# Patient Record
Sex: FEMALE | Race: WHITE | Employment: FULL TIME | URBAN - METROPOLITAN AREA
[De-identification: names, ages, dates, MRNs, and addresses within clinical notes are randomized per-mention and may not be internally consistent; named-entity substitution may affect disease eponyms.]

---

## 2017-07-27 ENCOUNTER — HOSPITAL ENCOUNTER (OUTPATIENT)
Dept: ULTRASOUND IMAGING | Age: 27
Discharge: HOME | End: 2017-07-27
Attending: PHYSICIAN ASSISTANT | Admitting: PHYSICIAN ASSISTANT

## 2017-07-27 DIAGNOSIS — R10.30 LOWER ABDOMINAL PAIN: ICD-10-CM

## 2017-12-19 ENCOUNTER — OFFICE VISIT (OUTPATIENT)
Dept: ORTHOPEDIC SURGERY | Age: 27
End: 2017-12-19

## 2017-12-19 VITALS — HEIGHT: 67 IN | WEIGHT: 293 LBS | BODY MASS INDEX: 45.99 KG/M2

## 2017-12-19 DIAGNOSIS — M79.642 PAIN IN BOTH HANDS: ICD-10-CM

## 2017-12-19 DIAGNOSIS — M19.041 ARTHRITIS OF BOTH HANDS: ICD-10-CM

## 2017-12-19 DIAGNOSIS — M19.042 ARTHRITIS OF BOTH HANDS: ICD-10-CM

## 2017-12-19 DIAGNOSIS — M79.641 PAIN IN BOTH HANDS: ICD-10-CM

## 2017-12-19 DIAGNOSIS — M25.521 RIGHT ELBOW PAIN: ICD-10-CM

## 2017-12-19 DIAGNOSIS — M77.11 RIGHT LATERAL EPICONDYLITIS: Primary | ICD-10-CM

## 2017-12-19 PROCEDURE — L3918 METACARP FX ORTHOSIS PRE OTS: HCPCS | Performed by: ORTHOPAEDIC SURGERY

## 2017-12-19 PROCEDURE — 73070 X-RAY EXAM OF ELBOW: CPT | Performed by: ORTHOPAEDIC SURGERY

## 2017-12-19 PROCEDURE — 99243 OFF/OP CNSLTJ NEW/EST LOW 30: CPT | Performed by: ORTHOPAEDIC SURGERY

## 2017-12-19 PROCEDURE — 73120 X-RAY EXAM OF HAND: CPT | Performed by: ORTHOPAEDIC SURGERY

## 2017-12-20 PROBLEM — M79.641 PAIN IN BOTH HANDS: Status: ACTIVE | Noted: 2017-12-20

## 2017-12-20 PROBLEM — M79.642 PAIN IN BOTH HANDS: Status: ACTIVE | Noted: 2017-12-20

## 2017-12-20 PROBLEM — M19.041 ARTHRITIS OF BOTH HANDS: Status: ACTIVE | Noted: 2017-12-20

## 2017-12-20 PROBLEM — M19.042 ARTHRITIS OF BOTH HANDS: Status: ACTIVE | Noted: 2017-12-20

## 2017-12-20 PROBLEM — M77.11 RIGHT LATERAL EPICONDYLITIS: Status: ACTIVE | Noted: 2017-12-20

## 2017-12-20 PROBLEM — M25.521 RIGHT ELBOW PAIN: Status: ACTIVE | Noted: 2017-12-20

## 2017-12-20 NOTE — PROGRESS NOTES
TENNIS ELBOW VISIT      HISTORY OF PRESENT ILLNESS    Diallo Martinez is a 28 y.o. female who presents for consultation at the request of Alejandra Wilburn CNP, for pain. She has an both hands and mostly her right elbow. She says she gets issues with pinching motions and also work with both hands and nothing seems to be helping her. She feels that it is giving worse over time. ROS    Well-documented in the patient history form dated 12/19/17  All other ROS negative except for above. Past Surgical history    Past Surgical History:   Procedure Laterality Date    APPENDECTOMY  2009    CHOLECYSTECTOMY  2009    TONSILLECTOMY  2007       PAST MEDICAL    History reviewed. No pertinent past medical history. Allergies    No Known Allergies    Meds    Current Outpatient Prescriptions   Medication Sig Dispense Refill    diclofenac 2 % SOLN Place 40 mg onto the skin 2 times daily PRN 1 Bottle 3    naproxen-esomeprazole (VIMOVO) 500-20 MG TBEC Take 1 tablet by mouth 2 times daily 60 tablet 3    UNKNOWN TO PATIENT Unknown name and dose of antibiotic for \"uti\"      albuterol (PROVENTIL HFA) 108 (90 BASE) MCG/ACT inhaler Inhale 1-2 puffs into the lungs every 6 hours as needed for Wheezing. 1 Inhaler 0     No current facility-administered medications for this visit. Social    Social History     Social History    Marital status:      Spouse name: N/A    Number of children: N/A    Years of education: N/A     Occupational History    Not on file.      Social History Main Topics    Smoking status: Never Smoker    Smokeless tobacco: Never Used    Alcohol use No    Drug use: No    Sexual activity: Yes     Partners: Male     Other Topics Concern    Not on file     Social History Narrative    No narrative on file       Family HISTORY    Family History   Problem Relation Age of Onset    Early Death Maternal Grandmother     Heart Disease Maternal Grandmother     Stroke Maternal Grandfather     Diabetes Paternal Grandfather        PHYSICAL EXAM    Vital Signs:  Ht 5' 7\" (1.702 m)   Wt 300 lb (136.1 kg)   BMI 46.99 kg/m²   General Appearance:  Normal body habitus. Alert and oriented to person, place, and time. Affect:  Normal.   Gait:  Normal. Good balance and coordination. Skin:  Intact. Sensation:  Intact in the median, ulnar and radial nerve distributions. Motor:  Normal in the median, ulnar and radial nerve distributions. Reflexes:  Intact. Pulses:  2+ radial pulses with brisk capillary refill to all fingers. Right Elbow Examination  Lateral Epicondyle is tender to palpation  Wrist extension test positive  Radial tunnel . Negative  Ligament stability: normal  Elbow flexion test is negative. Negative Tinels at the elbow. No ulnar nerve subluxation with flexion of elbow  No medial epicondyle tenderness to palpation  She has pain in both hands, mostly in the thumb areas at the base consistent with inflammation in the palm from repetitive use injury. She does not have an exam her testing at this point consistent with carpal tunnel syndrome in a pure sense. Range of motion  Right Left   Extension     Flexion     Supination     Pronation         IMAGING STUDIES    X-rays 2 views of both hands are normal.  X-rays 2 views of the right elbow are normal.        IMPRESSION    Right elbow pain secondary to lateral epicondylitis  Bilateral hand pain secondary to repetitive use injury    PLAN    1. Conservative care options including physical therapy, NSAIDs, counterforce bracing, and activity modification were discussed. 2.  The indications for therapeutic injections were discussed. 3.  The indications for additional imaging studies were discussed. 4.  After considering the various options discussed, the patient elected to pursue a course that includes bracing and medication and therapy. She may benefit from injections in the future.         Procedures    OTS CMC Restriction Brace Patient was prescribed a [facility] CMC Restriction Brace. The right thumb will require stabilization / immobilization from this semi-rigid / rigid orthosis to improve their function. The orthosis will assist in protecting the affected area, provide functional support and facilitate healing. The patient was educated and fit by a healthcare professional with expert knowledge and specialization in brace application while under the direct supervision of the physician. Verbal and written instructions for the use of and application of this item were provided. They were instructed to contact the office immediately should the brace result in increased pain, decreased sensation, increased swelling or worsening of the condition.

## 2019-05-14 ENCOUNTER — APPOINTMENT (OUTPATIENT)
Dept: GENERAL RADIOLOGY | Age: 29
End: 2019-05-14
Payer: COMMERCIAL

## 2019-05-14 ENCOUNTER — APPOINTMENT (OUTPATIENT)
Dept: CT IMAGING | Age: 29
End: 2019-05-14
Payer: COMMERCIAL

## 2019-05-14 ENCOUNTER — HOSPITAL ENCOUNTER (EMERGENCY)
Age: 29
Discharge: HOME | End: 2019-05-14
Attending: EMERGENCY MEDICINE
Payer: COMMERCIAL

## 2019-05-14 VITALS
HEIGHT: 66 IN | OXYGEN SATURATION: 99 % | RESPIRATION RATE: 16 BRPM | HEART RATE: 80 BPM | BODY MASS INDEX: 44.2 KG/M2 | TEMPERATURE: 98.6 F | DIASTOLIC BLOOD PRESSURE: 78 MMHG | WEIGHT: 275 LBS | SYSTOLIC BLOOD PRESSURE: 105 MMHG

## 2019-05-14 DIAGNOSIS — R07.1 CHEST PAIN ON BREATHING: Primary | ICD-10-CM

## 2019-05-14 LAB
A/G RATIO: 1.4 (ref 1.1–2.2)
ALBUMIN SERPL-MCNC: 4.5 G/DL (ref 3.4–5)
ALP BLD-CCNC: 82 U/L (ref 40–129)
ALT SERPL-CCNC: 15 U/L (ref 10–40)
ANION GAP SERPL CALCULATED.3IONS-SCNC: 9 MMOL/L (ref 3–16)
AST SERPL-CCNC: 17 U/L (ref 15–37)
BASOPHILS ABSOLUTE: 0.1 K/UL (ref 0–0.2)
BASOPHILS RELATIVE PERCENT: 0.4 %
BILIRUB SERPL-MCNC: 0.4 MG/DL (ref 0–1)
BUN BLDV-MCNC: 9 MG/DL (ref 7–20)
CALCIUM SERPL-MCNC: 9.9 MG/DL (ref 8.3–10.6)
CHLORIDE BLD-SCNC: 108 MMOL/L (ref 99–110)
CO2: 22 MMOL/L (ref 21–32)
CREAT SERPL-MCNC: 0.5 MG/DL (ref 0.6–1.1)
D DIMER: 210 NG/ML DDU (ref 0–229)
EKG ATRIAL RATE: 130 BPM
EKG DIAGNOSIS: NORMAL
EKG P AXIS: 88 DEGREES
EKG P-R INTERVAL: 146 MS
EKG Q-T INTERVAL: 310 MS
EKG QRS DURATION: 88 MS
EKG QTC CALCULATION (BAZETT): 456 MS
EKG R AXIS: 61 DEGREES
EKG T AXIS: 13 DEGREES
EKG VENTRICULAR RATE: 130 BPM
EOSINOPHILS ABSOLUTE: 0.1 K/UL (ref 0–0.6)
EOSINOPHILS RELATIVE PERCENT: 0.4 %
GFR AFRICAN AMERICAN: >60
GFR NON-AFRICAN AMERICAN: >60
GLOBULIN: 3.2 G/DL
GLUCOSE BLD-MCNC: 110 MG/DL (ref 70–99)
HCG(URINE) PREGNANCY TEST: NEGATIVE
HCT VFR BLD CALC: 41.4 % (ref 36–48)
HEMOGLOBIN: 13.7 G/DL (ref 12–16)
INR BLD: 1.12 (ref 0.86–1.14)
LYMPHOCYTES ABSOLUTE: 1.2 K/UL (ref 1–5.1)
LYMPHOCYTES RELATIVE PERCENT: 9.1 %
MCH RBC QN AUTO: 27.6 PG (ref 26–34)
MCHC RBC AUTO-ENTMCNC: 33.2 G/DL (ref 31–36)
MCV RBC AUTO: 83.2 FL (ref 80–100)
MONOCYTES ABSOLUTE: 0.8 K/UL (ref 0–1.3)
MONOCYTES RELATIVE PERCENT: 6.2 %
NEUTROPHILS ABSOLUTE: 10.9 K/UL (ref 1.7–7.7)
NEUTROPHILS RELATIVE PERCENT: 83.9 %
PDW BLD-RTO: 13.5 % (ref 12.4–15.4)
PLATELET # BLD: 280 K/UL (ref 135–450)
PMV BLD AUTO: 8.1 FL (ref 5–10.5)
POTASSIUM SERPL-SCNC: 4.9 MMOL/L (ref 3.5–5.1)
PROTHROMBIN TIME: 12.7 SEC (ref 9.8–13)
RBC # BLD: 4.98 M/UL (ref 4–5.2)
SODIUM BLD-SCNC: 139 MMOL/L (ref 136–145)
TOTAL PROTEIN: 7.7 G/DL (ref 6.4–8.2)
TROPONIN: <0.01 NG/ML
WBC # BLD: 13 K/UL (ref 4–11)

## 2019-05-14 PROCEDURE — 84484 ASSAY OF TROPONIN QUANT: CPT

## 2019-05-14 PROCEDURE — 71260 CT THORAX DX C+: CPT

## 2019-05-14 PROCEDURE — 96375 TX/PRO/DX INJ NEW DRUG ADDON: CPT

## 2019-05-14 PROCEDURE — 99285 EMERGENCY DEPT VISIT HI MDM: CPT

## 2019-05-14 PROCEDURE — 6360000004 HC RX CONTRAST MEDICATION: Performed by: EMERGENCY MEDICINE

## 2019-05-14 PROCEDURE — 80053 COMPREHEN METABOLIC PANEL: CPT

## 2019-05-14 PROCEDURE — 36415 COLL VENOUS BLD VENIPUNCTURE: CPT

## 2019-05-14 PROCEDURE — 6360000002 HC RX W HCPCS: Performed by: EMERGENCY MEDICINE

## 2019-05-14 PROCEDURE — 93010 ELECTROCARDIOGRAM REPORT: CPT | Performed by: INTERNAL MEDICINE

## 2019-05-14 PROCEDURE — 85379 FIBRIN DEGRADATION QUANT: CPT

## 2019-05-14 PROCEDURE — 71046 X-RAY EXAM CHEST 2 VIEWS: CPT

## 2019-05-14 PROCEDURE — 96374 THER/PROPH/DIAG INJ IV PUSH: CPT

## 2019-05-14 PROCEDURE — 84703 CHORIONIC GONADOTROPIN ASSAY: CPT

## 2019-05-14 PROCEDURE — 93005 ELECTROCARDIOGRAM TRACING: CPT | Performed by: EMERGENCY MEDICINE

## 2019-05-14 PROCEDURE — 85025 COMPLETE CBC W/AUTO DIFF WBC: CPT

## 2019-05-14 PROCEDURE — 6360000002 HC RX W HCPCS

## 2019-05-14 PROCEDURE — 85610 PROTHROMBIN TIME: CPT

## 2019-05-14 PROCEDURE — 2580000003 HC RX 258: Performed by: EMERGENCY MEDICINE

## 2019-05-14 RX ORDER — OXYCODONE HYDROCHLORIDE AND ACETAMINOPHEN 5; 325 MG/1; MG/1
1 TABLET ORAL EVERY 6 HOURS PRN
Qty: 12 TABLET | Refills: 0 | Status: SHIPPED | OUTPATIENT
Start: 2019-05-14 | End: 2019-05-17

## 2019-05-14 RX ORDER — MORPHINE SULFATE 10 MG/ML
INJECTION, SOLUTION INTRAMUSCULAR; INTRAVENOUS
Status: COMPLETED
Start: 2019-05-14 | End: 2019-05-14

## 2019-05-14 RX ORDER — SODIUM CHLORIDE 9 MG/ML
INJECTION, SOLUTION INTRAVENOUS CONTINUOUS
Status: DISCONTINUED | OUTPATIENT
Start: 2019-05-14 | End: 2019-05-14 | Stop reason: HOSPADM

## 2019-05-14 RX ORDER — KETOROLAC TROMETHAMINE 30 MG/ML
30 INJECTION, SOLUTION INTRAMUSCULAR; INTRAVENOUS ONCE
Status: COMPLETED | OUTPATIENT
Start: 2019-05-14 | End: 2019-05-14

## 2019-05-14 RX ORDER — DICLOFENAC SODIUM 75 MG/1
75 TABLET, DELAYED RELEASE ORAL 2 TIMES DAILY
Qty: 15 TABLET | Refills: 0 | Status: SHIPPED | OUTPATIENT
Start: 2019-05-14

## 2019-05-14 RX ORDER — ONDANSETRON 2 MG/ML
4 INJECTION INTRAMUSCULAR; INTRAVENOUS ONCE
Status: COMPLETED | OUTPATIENT
Start: 2019-05-14 | End: 2019-05-14

## 2019-05-14 RX ORDER — MORPHINE SULFATE 4 MG/ML
4 INJECTION, SOLUTION INTRAMUSCULAR; INTRAVENOUS ONCE
Status: DISCONTINUED | OUTPATIENT
Start: 2019-05-14 | End: 2019-05-14 | Stop reason: HOSPADM

## 2019-05-14 RX ADMIN — KETOROLAC TROMETHAMINE 30 MG: 30 INJECTION, SOLUTION INTRAMUSCULAR at 07:56

## 2019-05-14 RX ADMIN — IOPAMIDOL 75 ML: 755 INJECTION, SOLUTION INTRAVENOUS at 06:41

## 2019-05-14 RX ADMIN — MORPHINE SULFATE 4 MG: 10 INJECTION INTRAVENOUS at 06:10

## 2019-05-14 RX ADMIN — ONDANSETRON 4 MG: 2 INJECTION INTRAMUSCULAR; INTRAVENOUS at 06:10

## 2019-05-14 RX ADMIN — SODIUM CHLORIDE: 9 INJECTION, SOLUTION INTRAVENOUS at 06:10

## 2019-05-14 ASSESSMENT — ENCOUNTER SYMPTOMS
PHOTOPHOBIA: 0
ABDOMINAL DISTENTION: 0
ABDOMINAL PAIN: 0
COUGH: 0
CHEST TIGHTNESS: 0
CHOKING: 0
WHEEZING: 0
STRIDOR: 0
SHORTNESS OF BREATH: 1

## 2019-05-14 ASSESSMENT — PAIN SCALES - GENERAL
PAINLEVEL_OUTOF10: 7
PAINLEVEL_OUTOF10: 8
PAINLEVEL_OUTOF10: 7
PAINLEVEL_OUTOF10: 0

## 2019-05-14 ASSESSMENT — PAIN DESCRIPTION - ORIENTATION: ORIENTATION: ANTERIOR

## 2019-05-14 ASSESSMENT — PAIN DESCRIPTION - PAIN TYPE: TYPE: ACUTE PAIN

## 2019-05-14 ASSESSMENT — PAIN DESCRIPTION - LOCATION: LOCATION: CHEST

## 2019-05-14 NOTE — ED PROVIDER NOTES
shortness of breath. Negative for cough, choking, chest tightness, wheezing and stridor. Cardiovascular: Positive for chest pain. Negative for palpitations and leg swelling. Gastrointestinal: Negative for abdominal distention and abdominal pain. Genitourinary: Negative for decreased urine volume, difficulty urinating, pelvic pain, urgency, vaginal bleeding, vaginal discharge and vaginal pain. Neurological: Negative for dizziness, tremors, seizures, syncope, facial asymmetry, speech difficulty, weakness, light-headedness and headaches. All other systems reviewed and are negative. Patient Vitals for the past 24 hrs:   BP Temp Temp src Pulse Resp SpO2 Height Weight   05/14/19 0533 (!) 147/87 98.6 °F (37 °C) Oral 124 14 97 % 5' 6\" (1.676 m) 275 lb (124.7 kg)       Physical Exam   Constitutional: She is oriented to person, place, and time. She appears well-developed and well-nourished. No distress. HENT:   Head: Normocephalic. Right Ear: External ear normal.   Left Ear: External ear normal.   Mouth/Throat: Oropharynx is clear and moist.   Eyes: Pupils are equal, round, and reactive to light. Conjunctivae and EOM are normal. Right eye exhibits no discharge. Left eye exhibits no discharge. Neck: Normal range of motion. Neck supple. No thyromegaly present. Cardiovascular: Normal rate, regular rhythm, normal heart sounds and intact distal pulses. Exam reveals no gallop and no friction rub. No murmur heard. Pulmonary/Chest: Effort normal and breath sounds normal. No stridor. No respiratory distress. She has no wheezes. She has no rales. She exhibits no tenderness. Abdominal: Soft. Bowel sounds are normal. She exhibits no distension and no mass. There is no tenderness. There is no rebound and no guarding. No hernia. Neurological: She is alert and oriented to person, place, and time. She displays normal reflexes. No cranial nerve deficit or sensory deficit. She exhibits normal muscle tone. Coordination normal. GCS eye subscore is 4. GCS verbal subscore is 5. GCS motor subscore is 6. Skin: She is not diaphoretic. Psychiatric: She has a normal mood and affect. Her behavior is normal.   Nursing note and vitals reviewed. Procedures    MDM         Labs      Radiology      EKG Interpretation. EKG was interpreted by myself without the assistance of cardiology  Rate 1:30  Rhythm sinus tachycardia  No acute ST T wave changes  However patient does have a classical S1,q3 ,T3 pattern  No ectopy      The patient's workup has begun highly concerned about the possibility of a pulmonary embolism especially with her tachycardia and pleuritic left-sided chest pain she is not hypotensive  The patient will undergo CTA  The patient care will be turned over to my partner Dr. Jefferson Obregon at 7 AM.  A full report has been given.     Magdalena Allen MD  05/14/19 5398

## 2019-05-14 NOTE — LETTER
[facility] Emergency Department  [address] [address]  Phone: [phone]               May 14, 2019    Patient: Daisy Delatorre   YOB: 1990   Date of Visit: 5/14/2019       To Whom It May Concern:    Sundeep Griffin was seen and treated in our emergency department on 5/14/2019. She may return to work on 5/17/19.       Sincerely,       Jocelyn Whipple MD         Signature:__________________________________

## 2019-06-09 NOTE — ED NOTES
Instructed to return for worsening symptoms of cp sob, nausea palpitations. Diaphoresis. F/u with pcp this week. Push fluids. meds as directed. No diriving or working on percocet and no additional tylenol.  Pt v/u sig other present to drive     Estrella Hurley RN  05/14/19 0710
they're no murmur or rub is appreciated on the exam.  And patient's heart rate is now in a normal sinus rhythm. Tachycardia has resolved.     Xr Chest Standard (2 Vw)     Result Date: 5/14/2019  Mild left basilar airspace disease which is favored to represent atelectasis over pneumonia.      Ct Chest Pulmonary Embolism W Contrast     Result Date: 5/14/2019  No evidence of pulmonary embolism or acute pulmonary abnormality.         Discussed with the patient the results of the imaging. Dr. Polina Polo have discussed previous results with her. At this time she'll be given a dose of Toradol and I will consult her primary care which is intra-[place]. We discussed being off work as well as pain medication.     I placed a call to her primary caregiver Marlene Echols. He is currently in a meeting. Patient would like to go ahead and be discharged home she is asked to follow-up with him before returning to work. She is given a note for work. The next 3 days. She understands limitations of the workup that should something be changing or different she is asked to return. We discussed this at length she and her family are agreeable to this and she voiced understanding of all instructions and is discharged in good condition. Discussed results, diagnosis and plan with patient and/or family. Questions addressed. Disposition and follow-up agreed upon. Specific discharge instructions explained. The patient and/or family and I have discussed the diagnosis and risks, and we agree with discharging home to follow-up with their primary care, specialist or referral doctor. We also discussed returning to the Emergency Department immediately if new or worsening symptoms occur.  We have discussed the symptoms which are most concerning that necessitate immediate return.        The Clinical Impression is chest pain                 Rody Olsen MD  05/14/19 1710                   Revision History

## 2019-07-09 NOTE — ED PROVIDER NOTES
and rhythm normal S1-S2 without gallop or murmur abdomen is obese soft nontender no guarding rebound masses 77 no cyanosis erythema edema cords or [place] is placed in supine position worsens or discomfort when she is brought forward it makes it at her.  Again they're no murmur or rub is appreciated on the exam.  And patient's heart rate is now in a normal sinus rhythm.  Tachycardia has resolved.     Xr Chest Standard (2 Vw)     Result Date: 5/14/2019  Mild left basilar airspace disease which is favored to represent atelectasis over pneumonia.      Ct Chest Pulmonary Embolism W Contrast     Result Date: 5/14/2019  No evidence of pulmonary embolism or acute pulmonary abnormality.         Discussed with the patient the results of the imaging.  Dr. Huyen Graham have discussed previous results with her. Chanda Sprung this time she'll be given a dose of Toradol and I will consult her primary care which is intra-[facility].  We discussed being off work as well as pain medication.     I placed a call to her primary caregiver Rima Guevara is currently in a meeting. Jakie Castleman would like to go ahead and be discharged home she is asked to follow-up with him before returning to work. Abdullahi Nation is given a note for work. Ron Otonielof next 3 days. Abdullahi Nation understands limitations of the workup that should something be changing or different she is asked to return.  We discussed this at length she and her family are agreeable to this and she voiced understanding of all instructions and is discharged in good condition.    Discussed results, diagnosis and plan with patient and/or family.  Questions addressed.  Disposition and follow-up agreed upon.  Specific discharge instructions explained.   The patient and/or family and I have discussed the diagnosis and risks, and we agree with discharging home to follow-up with their primary care, specialist or referral doctor.  We also discussed returning to the Emergency Department immediately if new or worsening symptoms occur.  We have discussed the symptoms which are most concerning that necessitate immediate return.        The Clinical Impression is chest pain                 Cathy Deshpande MD  05/14/19 0628                     Revision History                         Cathy Deshpande MD  07/09/19 9315

## 2021-12-14 ENCOUNTER — HOSPITAL ENCOUNTER (OUTPATIENT)
Dept: ULTRASOUND IMAGING | Age: 31
Discharge: HOME | End: 2021-12-14
Payer: COMMERCIAL

## 2021-12-14 DIAGNOSIS — R59.1 LYMPHADENOPATHY: ICD-10-CM

## 2021-12-14 PROCEDURE — 76536 US EXAM OF HEAD AND NECK: CPT

## 2024-03-27 NOTE — PROGRESS NOTES
[facility]  DIVISION OF OTOLARYNGOLOGY- HEAD & NECK SURGERY  CONSULT    Patient Name: Ilene Forrest  Medical Record Number:  [id number]  Primary Care Physician:  Everardo Sweeney, APRN - CNP  Date of Consultation: 4/2/2024    Chief Complaint:   Chief Complaint   Patient presents with    New Patient     Started @ Dec treated with antibiotic, mucinex, and sinus rinses.  Given Steroid in Feb.  No relief.    Sinus Problem     Hoarseness for about 6 wk.        HISTORY OF PRESENT ILLNESS  Ilene is a(n) 33 y.o. female who presents for evaluation of sinus issues, sore throat, hoarseness, and eustachian tube dysfunction.  She has been given antibiotics Mucinex sinus rinses and steroids without relief.  She is also taking Flonase Astelin and Zyrtec.  She does not smoke nor has she ever.  Patient Active Problem List   Diagnosis    Pain in both hands    Right elbow pain    Arthritis of both hands    Right lateral epicondylitis     Past Surgical History:   Procedure Laterality Date    APPENDECTOMY  2009    CHOLECYSTECTOMY  2009    TONSILLECTOMY  2007     Family History   Problem Relation Age of Onset    Early Death Maternal Grandmother     Heart Disease Maternal Grandmother     Stroke Maternal Grandfather     Diabetes Paternal Grandfather      Social History     Socioeconomic History    Marital status:      Spouse name: Not on file    Number of children: Not on file    Years of education: Not on file    Highest education level: Not on file   Occupational History    Not on file   Tobacco Use    Smoking status: Never    Smokeless tobacco: Never   Substance and Sexual Activity    Alcohol use: No    Drug use: No    Sexual activity: Yes     Partners: Male   Other Topics Concern    Not on file   Social History Narrative    Not on file     Social Determinants of Health     Financial Resource Strain: Not on file   Food Insecurity: Not on file   Transportation Needs: Not on file   Physical Activity: Not on file   Stress: Not

## 2024-04-02 ENCOUNTER — OFFICE VISIT (OUTPATIENT)
Dept: ENT CLINIC | Age: 34
End: 2024-04-02
Payer: COMMERCIAL

## 2024-04-02 VITALS
HEIGHT: 66 IN | WEIGHT: 275 LBS | HEART RATE: 62 BPM | BODY MASS INDEX: 44.2 KG/M2 | DIASTOLIC BLOOD PRESSURE: 76 MMHG | SYSTOLIC BLOOD PRESSURE: 138 MMHG

## 2024-04-02 DIAGNOSIS — J30.2 SEASONAL ALLERGIES: ICD-10-CM

## 2024-04-02 DIAGNOSIS — K21.9 LARYNGOPHARYNGEAL REFLUX (LPR): Primary | ICD-10-CM

## 2024-04-02 PROCEDURE — 99204 OFFICE O/P NEW MOD 45 MIN: CPT | Performed by: STUDENT IN AN ORGANIZED HEALTH CARE EDUCATION/TRAINING PROGRAM

## 2024-04-02 PROCEDURE — 31575 DIAGNOSTIC LARYNGOSCOPY: CPT | Performed by: STUDENT IN AN ORGANIZED HEALTH CARE EDUCATION/TRAINING PROGRAM

## 2024-04-02 RX ORDER — AZELASTINE 1 MG/ML
1 SPRAY, METERED NASAL 2 TIMES DAILY
COMMUNITY

## 2024-04-02 RX ORDER — CETIRIZINE HYDROCHLORIDE 10 MG/1
10 TABLET ORAL DAILY
COMMUNITY

## 2024-04-02 RX ORDER — PANTOPRAZOLE SODIUM 20 MG/1
20 TABLET, DELAYED RELEASE ORAL
Qty: 90 TABLET | Refills: 1 | Status: SHIPPED | OUTPATIENT
Start: 2024-04-02

## 2024-04-02 RX ORDER — FLUTICASONE PROPIONATE 50 MCG
1 SPRAY, SUSPENSION (ML) NASAL DAILY
COMMUNITY

## 2024-04-02 ASSESSMENT — ENCOUNTER SYMPTOMS
COUGH: 0
SHORTNESS OF BREATH: 0
NAUSEA: 0
VOMITING: 0
EYE PAIN: 0
RHINORRHEA: 1

## 2024-06-03 ENCOUNTER — OFFICE VISIT (OUTPATIENT)
Dept: ENT CLINIC | Age: 34
End: 2024-06-03
Payer: COMMERCIAL

## 2024-06-03 VITALS
HEIGHT: 66 IN | BODY MASS INDEX: 44.2 KG/M2 | WEIGHT: 275 LBS | HEART RATE: 76 BPM | SYSTOLIC BLOOD PRESSURE: 152 MMHG | DIASTOLIC BLOOD PRESSURE: 94 MMHG

## 2024-06-03 DIAGNOSIS — J01.41 RECURRENT PANSINUSITIS: Primary | ICD-10-CM

## 2024-06-03 DIAGNOSIS — J30.2 SEASONAL ALLERGIES: ICD-10-CM

## 2024-06-03 DIAGNOSIS — K21.9 LARYNGOPHARYNGEAL REFLUX (LPR): ICD-10-CM

## 2024-06-03 PROCEDURE — 99213 OFFICE O/P EST LOW 20 MIN: CPT | Performed by: STUDENT IN AN ORGANIZED HEALTH CARE EDUCATION/TRAINING PROGRAM

## 2024-06-13 ENCOUNTER — HOSPITAL ENCOUNTER (OUTPATIENT)
Dept: CT IMAGING | Age: 34
Discharge: HOME | End: 2024-06-13
Attending: STUDENT IN AN ORGANIZED HEALTH CARE EDUCATION/TRAINING PROGRAM
Payer: COMMERCIAL

## 2024-06-13 DIAGNOSIS — J01.41 RECURRENT PANSINUSITIS: ICD-10-CM

## 2024-06-13 PROCEDURE — 70486 CT MAXILLOFACIAL W/O DYE: CPT

## 2024-06-25 ASSESSMENT — ENCOUNTER SYMPTOMS
VOICE CHANGE: 1
COUGH: 0
EYE PAIN: 0
RHINORRHEA: 1
VOMITING: 0
SHORTNESS OF BREATH: 0
NAUSEA: 0

## 2024-06-25 NOTE — H&P (VIEW-ONLY)
[facility]  DIVISION OF OTOLARYNGOLOGY- HEAD & NECK SURGERY  CONSULT    Patient Name: Ilene Forrest  Medical Record Number:  [id number]  Primary Care Physician:  Everardo Sweeney, APRN - CNP  Date of Consultation: 6/27/2024    Chief Complaint:   Chief Complaint   Patient presents with    Follow-up     Patient is being seen for a f/u ct results, neck pain and swelling.       HISTORY OF PRESENT ILLNESS  Ilene is a(n) 33 y.o. female who presents for evaluation of sinus issues, sore throat, hoarseness, and eustachian tube dysfunction.  She has been given antibiotics Mucinex sinus rinses and steroids without relief.  She is also taking Flonase Astelin and Zyrtec.  She does not smoke nor has she ever.    Interval history 6/3/2024  Ilene states that she is just finishing cefdinir and steroids for a double ear infection and nasal congestion.  She continues to have facial pain and pressure despite treatment.  She is currently taking Flonase Astelin and Zyrtec.    Interval History 6/27/2024  CT performed on 6/18/2024 and presents for results.  She continues to have facial pain and pressure with nasal discharge.  She is also having some right-sided neck pain.    Patient Active Problem List   Diagnosis    Pain in both hands    Right elbow pain    Arthritis of both hands    Right lateral epicondylitis     Past Surgical History:   Procedure Laterality Date    APPENDECTOMY  2009    CHOLECYSTECTOMY  2009    TONSILLECTOMY  2007     Family History   Problem Relation Age of Onset    Early Death Maternal Grandmother     Heart Disease Maternal Grandmother     Stroke Maternal Grandfather     Diabetes Paternal Grandfather      Social History     Socioeconomic History    Marital status:      Spouse name: Not on file    Number of children: Not on file    Years of education: Not on file    Highest education level: Not on file   Occupational History    Not on file   Tobacco Use    Smoking status: Never    Smokeless tobacco:  discussed with the patient.  Risks include but not limited to damage to the eye, the brain, fluid surrounding the brain, infection, bleeding, the risk of general anesthesia.  She is willing to accept these risks and move forward with surgery at her convenience.  Postoperative instructions were given for education purposes    2. Seasonal allergies  We will also have her perform allergy testing as she is on Flonase, Astelin, and Zyrtec  - FL PERCUTANEOUS TESTS W/ALLERGENIC EXTRACTS  - FL IC TSTS W/ALLGIC XTRCS DLYD TYP RXN W/READING    3. Deviated nasal septum  We will address the deviated nasal septum at the time of surgery    Follow-up 1 week after surgery.  Abiodun Marrufo DO  06/27/24    Medical Decision Making:  The following items were considered in medical decision making:  Independent review of images  Review / order clinical lab tests  Review / order radiology tests  Decision to obtain old records

## 2024-06-25 NOTE — PROGRESS NOTES
[facility]  DIVISION OF OTOLARYNGOLOGY- HEAD & NECK SURGERY  CONSULT    Patient Name: Ilene Forrest  Medical Record Number:  [id number]  Primary Care Physician:  Everardo Sweeney, APRN - CNP  Date of Consultation: 6/27/2024    Chief Complaint:   Chief Complaint   Patient presents with    Follow-up     Patient is being seen for a f/u ct results, neck pain and swelling.       HISTORY OF PRESENT ILLNESS  Ilene is a(n) 33 y.o. female who presents for evaluation of sinus issues, sore throat, hoarseness, and eustachian tube dysfunction.  She has been given antibiotics Mucinex sinus rinses and steroids without relief.  She is also taking Flonase Astelin and Zyrtec.  She does not smoke nor has she ever.    Interval history 6/3/2024  Ilene states that she is just finishing cefdinir and steroids for a double ear infection and nasal congestion.  She continues to have facial pain and pressure despite treatment.  She is currently taking Flonase Astelin and Zyrtec.    Interval History 6/27/2024  CT performed on 6/18/2024 and presents for results.  She continues to have facial pain and pressure with nasal discharge.  She is also having some right-sided neck pain.    Patient Active Problem List   Diagnosis    Pain in both hands    Right elbow pain    Arthritis of both hands    Right lateral epicondylitis     Past Surgical History:   Procedure Laterality Date    APPENDECTOMY  2009    CHOLECYSTECTOMY  2009    TONSILLECTOMY  2007     Family History   Problem Relation Age of Onset    Early Death Maternal Grandmother     Heart Disease Maternal Grandmother     Stroke Maternal Grandfather     Diabetes Paternal Grandfather      Social History     Socioeconomic History    Marital status:      Spouse name: Not on file    Number of children: Not on file    Years of education: Not on file    Highest education level: Not on file   Occupational History    Not on file   Tobacco Use    Smoking status: Never    Smokeless tobacco:

## 2024-06-27 ENCOUNTER — PREP FOR PROCEDURE (OUTPATIENT)
Dept: ENT CLINIC | Age: 34
End: 2024-06-27

## 2024-06-27 ENCOUNTER — OFFICE VISIT (OUTPATIENT)
Dept: ENT CLINIC | Age: 34
End: 2024-06-27
Payer: COMMERCIAL

## 2024-06-27 VITALS
WEIGHT: 275 LBS | RESPIRATION RATE: 16 BRPM | DIASTOLIC BLOOD PRESSURE: 88 MMHG | SYSTOLIC BLOOD PRESSURE: 140 MMHG | HEIGHT: 66 IN | BODY MASS INDEX: 44.2 KG/M2 | HEART RATE: 80 BPM

## 2024-06-27 DIAGNOSIS — J34.3 HYPERTROPHY OF NASAL TURBINATES: ICD-10-CM

## 2024-06-27 DIAGNOSIS — J34.2 DEVIATED NASAL SEPTUM: ICD-10-CM

## 2024-06-27 DIAGNOSIS — J32.4 CHRONIC PANSINUSITIS: Primary | ICD-10-CM

## 2024-06-27 DIAGNOSIS — J30.2 SEASONAL ALLERGIES: ICD-10-CM

## 2024-06-27 DIAGNOSIS — J32.4 CHRONIC PANSINUSITIS: ICD-10-CM

## 2024-06-27 PROCEDURE — 95028 IQ TSTS ALLERGY DELAYED RXN: CPT | Performed by: STUDENT IN AN ORGANIZED HEALTH CARE EDUCATION/TRAINING PROGRAM

## 2024-06-27 PROCEDURE — 99214 OFFICE O/P EST MOD 30 MIN: CPT | Performed by: STUDENT IN AN ORGANIZED HEALTH CARE EDUCATION/TRAINING PROGRAM

## 2024-06-27 PROCEDURE — 95004 PERQ TESTS W/ALRGNC XTRCS: CPT | Performed by: STUDENT IN AN ORGANIZED HEALTH CARE EDUCATION/TRAINING PROGRAM

## 2024-06-27 NOTE — PATIENT INSTRUCTIONS
SINUS SURGERY (FESS)  Post Operative Instructions    [facility] Number (24 hours): [phone]    The Surgery Itself  Endoscopic sinus surgery (with or without septoplasty and turbinate reduction) involves general anesthesia, typically for one to two hours. Patients may be sedated for several hours after surgery and may remain sleepy for the better part of the day. Nausea and vomiting are occasionally seen, and usually resolve by the evening of surgery - even without additional medications. Almost all patients can go home the day of surgery.    After Surgery  Facial pressure and fullness similar to a sinus infection/headache is normal after surgery. Breathing through your nose is also difficult due to swelling. A humidifier or vaporizer can be used in the bedroom to prevent throat pain with mouth breathing.  Bloody nasal drainage is normal after this surgery for 5-7 days, usually decreasing in volume with each day that passes. Drainage will flow from the front of the nose and down the back of the throat. Make sure you spit out blood drainage that drips down the back of your throat to prevent nausea/vomiting. You will have a nasal drip pad/sling with gauze to catch drainage from the front of your nose. The dressing may need to be changed frequently during the first 24 hours following surgery. In case of profuse nasal bleeding, you may apply ice to the bridge of the nose and pinch the nose just above the tip and hold for 10 minutes; if bleeding continues, contact the doctors office.  Started the day after sinus surgery use sinus irrigations at least 3 times per day (full bottle).  The most common way is to use Neomed sinus rinses.  Fill the bottle with distilled water and mixed the salt bicarbonate packet.  You can be seen at this website: [email or web address]   It is more comfortable to sleep with extra pillows or in a recliner for the first few days after surgery until the

## 2024-07-01 ENCOUNTER — TELEPHONE (OUTPATIENT)
Dept: ENT CLINIC | Age: 34
End: 2024-07-01

## 2024-07-02 ENCOUNTER — PATIENT MESSAGE (OUTPATIENT)
Dept: ENT CLINIC | Age: 34
End: 2024-07-02

## 2024-07-02 RX ORDER — METHYLPREDNISOLONE 4 MG/1
TABLET ORAL
Qty: 1 KIT | Refills: 0 | Status: SHIPPED | OUTPATIENT
Start: 2024-07-02 | End: 2024-07-08

## 2024-07-02 NOTE — TELEPHONE ENCOUNTER
From: Ilene Forrest  To: Dr. Abiodun Marrufo  Sent: 7/2/2024 11:14 AM EDT  Subject: Ear pain    Good morning Abiodun,    I was curious if it would be ok to have another round of steroids and antibiotics to provide some relief for my vacation next week. The ear fulness and pain has returned and is getting worse each day. My PCP said the ears appear full and she was ok with another round but wanted it cleared by you with the upcoming surgery.    Thanks,   Ilene

## 2024-07-05 ENCOUNTER — TELEPHONE (OUTPATIENT)
Dept: ENT CLINIC | Age: 34
End: 2024-07-05

## 2024-07-05 ENCOUNTER — PATIENT MESSAGE (OUTPATIENT)
Dept: ENT CLINIC | Age: 34
End: 2024-07-05

## 2024-07-05 NOTE — TELEPHONE ENCOUNTER
From: Ilene Forrest  To: Dr. Abiodun Marrufo  Sent: 7/5/2024 7:16 AM EDT  Subject: Allergy Testing    Good morning,   I have yet to hear from the allergy testing department, is there a number I can call to schedule it myself?     Thanks,  Ilene

## 2024-07-10 ENCOUNTER — TELEPHONE (OUTPATIENT)
Dept: ENT CLINIC | Age: 34
End: 2024-07-10

## 2024-07-10 NOTE — TELEPHONE ENCOUNTER
Asked by Dr. Marrufo to discuss allergy testing with this patient.  Allergy Welcome Packet given to patient in the office and contents reviewed over the phone.  Patient will call to check insurance coverage.     Pt verb understanding to stop taking all antihistamines 7 days prior to testing. Also to stop Astelin nasal spray 2 days before testing.     Allergy testing scheduled for 8/8/24.

## 2024-07-15 RX ORDER — SODIUM CHLORIDE 9 MG/ML
INJECTION, SOLUTION INTRAVENOUS PRN
Status: CANCELLED | OUTPATIENT
Start: 2024-07-15

## 2024-07-15 RX ORDER — SODIUM CHLORIDE 0.9 % (FLUSH) 0.9 %
5-40 SYRINGE (ML) INJECTION EVERY 12 HOURS SCHEDULED
Status: CANCELLED | OUTPATIENT
Start: 2024-07-15

## 2024-07-15 RX ORDER — SODIUM CHLORIDE 0.9 % (FLUSH) 0.9 %
5-40 SYRINGE (ML) INJECTION PRN
Status: CANCELLED | OUTPATIENT
Start: 2024-07-15

## 2024-07-16 ENCOUNTER — ANESTHESIA EVENT (OUTPATIENT)
Dept: OPERATING ROOM | Age: 34
End: 2024-07-16
Payer: COMMERCIAL

## 2024-07-16 ENCOUNTER — TELEPHONE (OUTPATIENT)
Dept: ENT CLINIC | Age: 34
End: 2024-07-16

## 2024-07-16 NOTE — TELEPHONE ENCOUNTER
Called [facility] and spoke to Delia, she gave a Auth # of [id number]. Auth is good for surgery date of 7/17/24

## 2024-07-17 ENCOUNTER — ANESTHESIA (OUTPATIENT)
Dept: OPERATING ROOM | Age: 34
End: 2024-07-17
Payer: COMMERCIAL

## 2024-07-17 ENCOUNTER — HOSPITAL ENCOUNTER (OUTPATIENT)
Age: 34
Setting detail: OUTPATIENT SURGERY
Discharge: HOME | End: 2024-07-17
Attending: STUDENT IN AN ORGANIZED HEALTH CARE EDUCATION/TRAINING PROGRAM | Admitting: STUDENT IN AN ORGANIZED HEALTH CARE EDUCATION/TRAINING PROGRAM
Payer: COMMERCIAL

## 2024-07-17 VITALS
HEART RATE: 82 BPM | OXYGEN SATURATION: 94 % | RESPIRATION RATE: 11 BRPM | TEMPERATURE: 97.6 F | SYSTOLIC BLOOD PRESSURE: 154 MMHG | DIASTOLIC BLOOD PRESSURE: 111 MMHG

## 2024-07-17 DIAGNOSIS — J34.2 DEVIATED NASAL SEPTUM: ICD-10-CM

## 2024-07-17 DIAGNOSIS — J32.4 CHRONIC PANSINUSITIS: ICD-10-CM

## 2024-07-17 DIAGNOSIS — Z98.890 STATUS POST SURGERY: Primary | ICD-10-CM

## 2024-07-17 DIAGNOSIS — J34.3 HYPERTROPHY OF NASAL TURBINATES: ICD-10-CM

## 2024-07-17 LAB — HCG UR QL: NEGATIVE

## 2024-07-17 PROCEDURE — 7100000001 HC PACU RECOVERY - ADDTL 15 MIN: Performed by: STUDENT IN AN ORGANIZED HEALTH CARE EDUCATION/TRAINING PROGRAM

## 2024-07-17 PROCEDURE — 6360000002 HC RX W HCPCS: Performed by: NURSE ANESTHETIST, CERTIFIED REGISTERED

## 2024-07-17 PROCEDURE — 6370000000 HC RX 637 (ALT 250 FOR IP): Performed by: STUDENT IN AN ORGANIZED HEALTH CARE EDUCATION/TRAINING PROGRAM

## 2024-07-17 PROCEDURE — 30140 RESECT INFERIOR TURBINATE: CPT | Performed by: STUDENT IN AN ORGANIZED HEALTH CARE EDUCATION/TRAINING PROGRAM

## 2024-07-17 PROCEDURE — 2720000010 HC SURG SUPPLY STERILE: Performed by: STUDENT IN AN ORGANIZED HEALTH CARE EDUCATION/TRAINING PROGRAM

## 2024-07-17 PROCEDURE — 3600000014 HC SURGERY LEVEL 4 ADDTL 15MIN: Performed by: STUDENT IN AN ORGANIZED HEALTH CARE EDUCATION/TRAINING PROGRAM

## 2024-07-17 PROCEDURE — 61782 SCAN PROC CRANIAL EXTRA: CPT | Performed by: STUDENT IN AN ORGANIZED HEALTH CARE EDUCATION/TRAINING PROGRAM

## 2024-07-17 PROCEDURE — 2500000003 HC RX 250 WO HCPCS: Performed by: NURSE ANESTHETIST, CERTIFIED REGISTERED

## 2024-07-17 PROCEDURE — 84703 CHORIONIC GONADOTROPIN ASSAY: CPT

## 2024-07-17 PROCEDURE — 2580000003 HC RX 258: Performed by: STUDENT IN AN ORGANIZED HEALTH CARE EDUCATION/TRAINING PROGRAM

## 2024-07-17 PROCEDURE — 31267 ENDOSCOPY MAXILLARY SINUS: CPT | Performed by: STUDENT IN AN ORGANIZED HEALTH CARE EDUCATION/TRAINING PROGRAM

## 2024-07-17 PROCEDURE — 88304 TISSUE EXAM BY PATHOLOGIST: CPT

## 2024-07-17 PROCEDURE — 3700000001 HC ADD 15 MINUTES (ANESTHESIA): Performed by: STUDENT IN AN ORGANIZED HEALTH CARE EDUCATION/TRAINING PROGRAM

## 2024-07-17 PROCEDURE — 6370000000 HC RX 637 (ALT 250 FOR IP): Performed by: ANESTHESIOLOGY

## 2024-07-17 PROCEDURE — 7100000000 HC PACU RECOVERY - FIRST 15 MIN: Performed by: STUDENT IN AN ORGANIZED HEALTH CARE EDUCATION/TRAINING PROGRAM

## 2024-07-17 PROCEDURE — 2500000003 HC RX 250 WO HCPCS: Performed by: STUDENT IN AN ORGANIZED HEALTH CARE EDUCATION/TRAINING PROGRAM

## 2024-07-17 PROCEDURE — 31276 NSL/SINS NDSC FRNT TISS RMVL: CPT | Performed by: STUDENT IN AN ORGANIZED HEALTH CARE EDUCATION/TRAINING PROGRAM

## 2024-07-17 PROCEDURE — 2709999900 HC NON-CHARGEABLE SUPPLY: Performed by: STUDENT IN AN ORGANIZED HEALTH CARE EDUCATION/TRAINING PROGRAM

## 2024-07-17 PROCEDURE — A4217 STERILE WATER/SALINE, 500 ML: HCPCS | Performed by: STUDENT IN AN ORGANIZED HEALTH CARE EDUCATION/TRAINING PROGRAM

## 2024-07-17 PROCEDURE — 7100000011 HC PHASE II RECOVERY - ADDTL 15 MIN: Performed by: STUDENT IN AN ORGANIZED HEALTH CARE EDUCATION/TRAINING PROGRAM

## 2024-07-17 PROCEDURE — 3600000004 HC SURGERY LEVEL 4 BASE: Performed by: STUDENT IN AN ORGANIZED HEALTH CARE EDUCATION/TRAINING PROGRAM

## 2024-07-17 PROCEDURE — 30520 REPAIR OF NASAL SEPTUM: CPT | Performed by: STUDENT IN AN ORGANIZED HEALTH CARE EDUCATION/TRAINING PROGRAM

## 2024-07-17 PROCEDURE — 3700000000 HC ANESTHESIA ATTENDED CARE: Performed by: STUDENT IN AN ORGANIZED HEALTH CARE EDUCATION/TRAINING PROGRAM

## 2024-07-17 PROCEDURE — 7100000010 HC PHASE II RECOVERY - FIRST 15 MIN: Performed by: STUDENT IN AN ORGANIZED HEALTH CARE EDUCATION/TRAINING PROGRAM

## 2024-07-17 PROCEDURE — 6360000002 HC RX W HCPCS: Performed by: ANESTHESIOLOGY

## 2024-07-17 PROCEDURE — 31257 NSL/SINS NDSC TOT W/SPHENDT: CPT | Performed by: STUDENT IN AN ORGANIZED HEALTH CARE EDUCATION/TRAINING PROGRAM

## 2024-07-17 PROCEDURE — 2580000003 HC RX 258: Performed by: ANESTHESIOLOGY

## 2024-07-17 RX ORDER — DEXAMETHASONE SODIUM PHOSPHATE 4 MG/ML
INJECTION, SOLUTION INTRA-ARTICULAR; INTRALESIONAL; INTRAMUSCULAR; INTRAVENOUS; SOFT TISSUE PRN
Status: DISCONTINUED | OUTPATIENT
Start: 2024-07-17 | End: 2024-07-17 | Stop reason: SDUPTHER

## 2024-07-17 RX ORDER — SODIUM CHLORIDE 0.9 % (FLUSH) 0.9 %
5-40 SYRINGE (ML) INJECTION PRN
Status: DISCONTINUED | OUTPATIENT
Start: 2024-07-17 | End: 2024-07-17 | Stop reason: HOSPADM

## 2024-07-17 RX ORDER — METHYLPREDNISOLONE 4 MG/1
TABLET ORAL
Qty: 1 KIT | Refills: 0 | Status: SHIPPED | OUTPATIENT
Start: 2024-07-17 | End: 2024-07-23

## 2024-07-17 RX ORDER — OXYMETAZOLINE HYDROCHLORIDE 0.05 G/100ML
SPRAY NASAL PRN
Status: DISCONTINUED | OUTPATIENT
Start: 2024-07-17 | End: 2024-07-17 | Stop reason: ALTCHOICE

## 2024-07-17 RX ORDER — DIPHENHYDRAMINE HYDROCHLORIDE 50 MG/ML
6.25 INJECTION INTRAMUSCULAR; INTRAVENOUS
Status: DISCONTINUED | OUTPATIENT
Start: 2024-07-17 | End: 2024-07-17 | Stop reason: HOSPADM

## 2024-07-17 RX ORDER — SODIUM CHLORIDE 0.9 % (FLUSH) 0.9 %
5-40 SYRINGE (ML) INJECTION EVERY 12 HOURS SCHEDULED
Status: DISCONTINUED | OUTPATIENT
Start: 2024-07-17 | End: 2024-07-17 | Stop reason: HOSPADM

## 2024-07-17 RX ORDER — FENTANYL CITRATE 50 UG/ML
INJECTION, SOLUTION INTRAMUSCULAR; INTRAVENOUS PRN
Status: DISCONTINUED | OUTPATIENT
Start: 2024-07-17 | End: 2024-07-17 | Stop reason: SDUPTHER

## 2024-07-17 RX ORDER — MIDAZOLAM HYDROCHLORIDE 1 MG/ML
INJECTION INTRAMUSCULAR; INTRAVENOUS PRN
Status: DISCONTINUED | OUTPATIENT
Start: 2024-07-17 | End: 2024-07-17 | Stop reason: SDUPTHER

## 2024-07-17 RX ORDER — HYDROMORPHONE HYDROCHLORIDE 2 MG/ML
INJECTION, SOLUTION INTRAMUSCULAR; INTRAVENOUS; SUBCUTANEOUS PRN
Status: DISCONTINUED | OUTPATIENT
Start: 2024-07-17 | End: 2024-07-17 | Stop reason: SDUPTHER

## 2024-07-17 RX ORDER — ONDANSETRON 2 MG/ML
INJECTION INTRAMUSCULAR; INTRAVENOUS PRN
Status: DISCONTINUED | OUTPATIENT
Start: 2024-07-17 | End: 2024-07-17 | Stop reason: SDUPTHER

## 2024-07-17 RX ORDER — SODIUM CHLORIDE, SODIUM LACTATE, POTASSIUM CHLORIDE, CALCIUM CHLORIDE 600; 310; 30; 20 MG/100ML; MG/100ML; MG/100ML; MG/100ML
INJECTION, SOLUTION INTRAVENOUS CONTINUOUS
Status: DISCONTINUED | OUTPATIENT
Start: 2024-07-17 | End: 2024-07-17 | Stop reason: HOSPADM

## 2024-07-17 RX ORDER — ROCURONIUM BROMIDE 10 MG/ML
INJECTION, SOLUTION INTRAVENOUS PRN
Status: DISCONTINUED | OUTPATIENT
Start: 2024-07-17 | End: 2024-07-17 | Stop reason: SDUPTHER

## 2024-07-17 RX ORDER — MIDAZOLAM HYDROCHLORIDE 1 MG/ML
2 INJECTION INTRAMUSCULAR; INTRAVENOUS
Status: DISCONTINUED | OUTPATIENT
Start: 2024-07-17 | End: 2024-07-17 | Stop reason: HOSPADM

## 2024-07-17 RX ORDER — LIDOCAINE HYDROCHLORIDE 20 MG/ML
INJECTION, SOLUTION EPIDURAL; INFILTRATION; INTRACAUDAL; PERINEURAL PRN
Status: DISCONTINUED | OUTPATIENT
Start: 2024-07-17 | End: 2024-07-17 | Stop reason: SDUPTHER

## 2024-07-17 RX ORDER — SODIUM CHLORIDE 9 MG/ML
INJECTION, SOLUTION INTRAVENOUS PRN
Status: DISCONTINUED | OUTPATIENT
Start: 2024-07-17 | End: 2024-07-17 | Stop reason: HOSPADM

## 2024-07-17 RX ORDER — LIDOCAINE HYDROCHLORIDE 10 MG/ML
1 INJECTION, SOLUTION EPIDURAL; INFILTRATION; INTRACAUDAL; PERINEURAL
Status: DISCONTINUED | OUTPATIENT
Start: 2024-07-17 | End: 2024-07-17 | Stop reason: HOSPADM

## 2024-07-17 RX ORDER — LIDOCAINE HYDROCHLORIDE AND EPINEPHRINE 10; 10 MG/ML; UG/ML
INJECTION, SOLUTION INFILTRATION; PERINEURAL PRN
Status: DISCONTINUED | OUTPATIENT
Start: 2024-07-17 | End: 2024-07-17 | Stop reason: ALTCHOICE

## 2024-07-17 RX ORDER — PROPOFOL 10 MG/ML
INJECTION, EMULSION INTRAVENOUS CONTINUOUS PRN
Status: DISCONTINUED | OUTPATIENT
Start: 2024-07-17 | End: 2024-07-17 | Stop reason: SDUPTHER

## 2024-07-17 RX ORDER — MEPERIDINE HYDROCHLORIDE 50 MG/ML
12.5 INJECTION INTRAMUSCULAR; INTRAVENOUS; SUBCUTANEOUS EVERY 5 MIN PRN
Status: DISCONTINUED | OUTPATIENT
Start: 2024-07-17 | End: 2024-07-17 | Stop reason: HOSPADM

## 2024-07-17 RX ORDER — DOXYCYCLINE HYCLATE 100 MG
100 TABLET ORAL 2 TIMES DAILY
Qty: 20 TABLET | Refills: 0 | Status: SHIPPED | OUTPATIENT
Start: 2024-07-17 | End: 2024-07-27

## 2024-07-17 RX ORDER — ONDANSETRON 2 MG/ML
4 INJECTION INTRAMUSCULAR; INTRAVENOUS ONCE
Status: DISCONTINUED | OUTPATIENT
Start: 2024-07-17 | End: 2024-07-17 | Stop reason: HOSPADM

## 2024-07-17 RX ORDER — MAGNESIUM HYDROXIDE 1200 MG/15ML
LIQUID ORAL CONTINUOUS PRN
Status: COMPLETED | OUTPATIENT
Start: 2024-07-17 | End: 2024-07-17

## 2024-07-17 RX ORDER — OXYCODONE HYDROCHLORIDE 5 MG/1
10 TABLET ORAL PRN
Status: DISCONTINUED | OUTPATIENT
Start: 2024-07-17 | End: 2024-07-17 | Stop reason: HOSPADM

## 2024-07-17 RX ORDER — NALOXONE HYDROCHLORIDE 0.4 MG/ML
INJECTION, SOLUTION INTRAMUSCULAR; INTRAVENOUS; SUBCUTANEOUS PRN
Status: DISCONTINUED | OUTPATIENT
Start: 2024-07-17 | End: 2024-07-17 | Stop reason: HOSPADM

## 2024-07-17 RX ORDER — PROPOFOL 10 MG/ML
INJECTION, EMULSION INTRAVENOUS PRN
Status: DISCONTINUED | OUTPATIENT
Start: 2024-07-17 | End: 2024-07-17 | Stop reason: SDUPTHER

## 2024-07-17 RX ORDER — OXYCODONE HYDROCHLORIDE AND ACETAMINOPHEN 5; 325 MG/1; MG/1
1 TABLET ORAL EVERY 6 HOURS PRN
Qty: 12 TABLET | Refills: 0 | Status: SHIPPED | OUTPATIENT
Start: 2024-07-17 | End: 2024-07-20

## 2024-07-17 RX ORDER — OXYCODONE HYDROCHLORIDE 5 MG/1
5 TABLET ORAL PRN
Status: DISCONTINUED | OUTPATIENT
Start: 2024-07-17 | End: 2024-07-17 | Stop reason: HOSPADM

## 2024-07-17 RX ADMIN — LIDOCAINE HYDROCHLORIDE 80 MG: 20 INJECTION, SOLUTION EPIDURAL; INFILTRATION; INTRACAUDAL; PERINEURAL at 12:31

## 2024-07-17 RX ADMIN — ROCURONIUM BROMIDE 50 MG: 50 INJECTION, SOLUTION INTRAVENOUS at 12:31

## 2024-07-17 RX ADMIN — SODIUM CHLORIDE, SODIUM LACTATE, POTASSIUM CHLORIDE, AND CALCIUM CHLORIDE: .6; .31; .03; .02 INJECTION, SOLUTION INTRAVENOUS at 11:56

## 2024-07-17 RX ADMIN — ONDANSETRON 4 MG: 2 INJECTION INTRAMUSCULAR; INTRAVENOUS at 12:27

## 2024-07-17 RX ADMIN — HYDROMORPHONE HYDROCHLORIDE 1 MG: 2 INJECTION, SOLUTION INTRAMUSCULAR; INTRAVENOUS; SUBCUTANEOUS at 12:49

## 2024-07-17 RX ADMIN — PROPOFOL 150 MCG/KG/MIN: 10 INJECTION, EMULSION INTRAVENOUS at 12:29

## 2024-07-17 RX ADMIN — MIDAZOLAM 2 MG: 1 INJECTION INTRAMUSCULAR; INTRAVENOUS at 12:26

## 2024-07-17 RX ADMIN — ROCURONIUM BROMIDE 25 MG: 50 INJECTION, SOLUTION INTRAVENOUS at 13:44

## 2024-07-17 RX ADMIN — HYDROMORPHONE HYDROCHLORIDE 0.5 MG: 1 INJECTION, SOLUTION INTRAMUSCULAR; INTRAVENOUS; SUBCUTANEOUS at 15:11

## 2024-07-17 RX ADMIN — SODIUM CHLORIDE, SODIUM LACTATE, POTASSIUM CHLORIDE, AND CALCIUM CHLORIDE: .6; .31; .03; .02 INJECTION, SOLUTION INTRAVENOUS at 13:21

## 2024-07-17 RX ADMIN — DEXAMETHASONE SODIUM PHOSPHATE 4 MG: 4 INJECTION, SOLUTION INTRAMUSCULAR; INTRAVENOUS at 12:47

## 2024-07-17 RX ADMIN — HYDROMORPHONE HYDROCHLORIDE 0.5 MG: 1 INJECTION, SOLUTION INTRAMUSCULAR; INTRAVENOUS; SUBCUTANEOUS at 14:51

## 2024-07-17 RX ADMIN — HYDROMORPHONE HYDROCHLORIDE 0.5 MG: 1 INJECTION, SOLUTION INTRAMUSCULAR; INTRAVENOUS; SUBCUTANEOUS at 15:00

## 2024-07-17 RX ADMIN — OXYCODONE 5 MG: 5 TABLET ORAL at 15:32

## 2024-07-17 RX ADMIN — FENTANYL CITRATE 100 MCG: 50 INJECTION, SOLUTION INTRAMUSCULAR; INTRAVENOUS at 12:31

## 2024-07-17 RX ADMIN — ONDANSETRON 4 MG: 2 INJECTION INTRAMUSCULAR; INTRAVENOUS at 12:47

## 2024-07-17 RX ADMIN — SUGAMMADEX 400 MG: 100 INJECTION, SOLUTION INTRAVENOUS at 14:28

## 2024-07-17 RX ADMIN — PROPOFOL 250 MG: 10 INJECTION, EMULSION INTRAVENOUS at 12:31

## 2024-07-17 ASSESSMENT — PAIN DESCRIPTION - ONSET
ONSET: GRADUAL
ONSET: ON-GOING
ONSET: ON-GOING

## 2024-07-17 ASSESSMENT — PAIN DESCRIPTION - ORIENTATION
ORIENTATION: ANTERIOR

## 2024-07-17 ASSESSMENT — PAIN DESCRIPTION - DESCRIPTORS
DESCRIPTORS: ACHING

## 2024-07-17 ASSESSMENT — PAIN DESCRIPTION - LOCATION
LOCATION: HEAD

## 2024-07-17 ASSESSMENT — PAIN DESCRIPTION - FREQUENCY
FREQUENCY: INTERMITTENT

## 2024-07-17 ASSESSMENT — PAIN DESCRIPTION - PAIN TYPE
TYPE: ACUTE PAIN

## 2024-07-17 ASSESSMENT — PAIN SCALES - GENERAL
PAINLEVEL_OUTOF10: 8
PAINLEVEL_OUTOF10: 6
PAINLEVEL_OUTOF10: 8

## 2024-07-17 ASSESSMENT — PAIN - FUNCTIONAL ASSESSMENT
PAIN_FUNCTIONAL_ASSESSMENT: ACTIVITIES ARE NOT PREVENTED
PAIN_FUNCTIONAL_ASSESSMENT: 0-10

## 2024-07-17 NOTE — PROGRESS NOTES
A: Discussed oral pain meds for longer lasting pain relief, patient agreed, provided patient with saltines and ice water, also brought family back to bedside.

## 2024-07-17 NOTE — PROGRESS NOTES
Pt brought to PACU. Report obtained from OR RN and anesthesia. Pt placed on monitor and O2 at  15L NRB

## 2024-07-17 NOTE — ANESTHESIA POSTPROCEDURE EVALUATION
Department of Anesthesiology  Postprocedure Note    Patient: Ilene Forrest  MRN: [id number]  YOB: 1990  Date of evaluation: 7/17/2024    Procedure Summary       Date: 07/17/24 Room / Location: [address]    Anesthesia Start: 1226 Anesthesia Stop: 1447    Procedure: BILATERAL INFERIOR TURBINATE REDUCTION,  MAXILLARY ANTROSTOMY WITH TISSUE REMOVAL, TOTAL ETHMOID AND SPHENOID WITH TISSUE REMOVAL, FRONTAL SINUS EXPLORATION WITH IMAGE GUIDANCE, SEPTOPLASTY (Bilateral: Nose) Diagnosis:       Chronic pansinusitis      Deviated nasal septum      Hypertrophy of nasal turbinates      (Chronic pansinusitis [J32.4])      (Deviated nasal septum [J34.2])      (Hypertrophy of nasal turbinates [J34.3])    Surgeons: Abiodun Marrufo DO Responsible Provider: Herman Rayo MD    Anesthesia Type: general ASA Status: 3            Anesthesia Type: No value filed.    Cecil Phase I: Cecil Score: 10    Cecil Phase II: Cecil Score: 10    Anesthesia Post Evaluation    Comments: Anes Post-op Note    Name:    Ilene Forrest  MRN:      [id number]    Patient Vitals in the past 12 hrs:  07/17/24 1555, BP:(!) 154/111, Pulse:82, Resp:11, SpO2:94 %  07/17/24 1550, BP:(!) 146/108, Pulse:82, Resp:15, SpO2:94 %  07/17/24 1545, Pulse:85, Resp:14, SpO2:94 %  07/17/24 1540, BP:(!) 148/103, Pulse:88, Resp:16, SpO2:94 %  07/17/24 1535, BP:(!) 146/100, Pulse:89, Resp:16, SpO2:96 %  07/17/24 1530, BP:(!) 148/93, Pulse:91, Resp:13, SpO2:95 %  07/17/24 1525, BP:(!) 141/99, Pulse:96, Resp:11, SpO2:95 %  07/17/24 1520, BP:(!) 146/97, Pulse:96, Resp:10, SpO2:97 %  07/17/24 1515, BP:(!) 136/101, Pulse:93, Resp:16, SpO2:93 %  07/17/24 1510, BP:118/87, Pulse:94, Resp:16, SpO2:97 %  07/17/24 1506, BP:(!) 146/79, Pulse:92, Resp:11, SpO2:96 %  07/17/24 1505, Pulse:95, Resp:16, SpO2:96 %  07/17/24 1501, Pulse:98, Resp:13, SpO2:100 %  07/17/24 1500, Pulse:96, Resp:17, SpO2:100 %  07/17/24 1456, BP:(!) 137/93, Temp:97.6

## 2024-07-17 NOTE — OP NOTE
[facility]  DIVISION OF OTOLARYNGOLOGY- HEAD & NECK SURGERY  OPERATIVE REPORT    Patient: Ilene Forrest  YOB: 1990  MRN: [id number]    Date of Procedure: 7/17/2024    Pre-Op Diagnosis Codes:     * Chronic pansinusitis [J32.4]     * Deviated nasal septum [J34.2]     * Hypertrophy of nasal turbinates [J34.3]    Post-Op Diagnosis: Same       Procedure(s):  BILATERAL INFERIOR TURBINATE REDUCTION,  MAXILLARY ANTROSTOMY WITH TISSUE REMOVAL, TOTAL ETHMOID AND SPHENOID WITH TISSUE REMOVAL, FRONTAL SINUS EXPLORATION WITH IMAGE GUIDANCE, SEPTOPLASTY    Surgeon(s):  Abiodun Marrufo DO    Assistant:   Circulator: Lexy Wiseman RN  Surgical Assistant: Kaelyn Pagan Scrub: Emerald Greene  Scrub Person First: Stephany Pérez    Anesthesia: General    Estimated Blood Loss (mL): 50 mL    Complications: None    Specimens:   ID Type Source Tests Collected by Time Destination   A : BILATERAL NASAL CONTENTS Tissue Tissue SURGICAL PATHOLOGY Abiodun Marrufo DO 7/17/2024 1318        Implants:  Implant Name Type Inv. Item Serial No.  Lot No. LRB No. Used Action   PROPEL CONTOUR SINUS IMPLANT     [id number] Bilateral 2 Implanted         Drains: * No LDAs found *    Findings:  Infection Present At Time Of Surgery (PATOS) (choose all levels that have infection present):  No infection present  Other Findings: 1) deviated septum to the left 2) polypoid mucosa throughout the sinuses    DETAILS OF PROCEDURE(S):   Patient was brought to the operating room and placed supine on the operating room table.  After general anesthesia was obtained, Afrin soaked pledgets were placed into both nasal cavities.  The Fusion headset was placed and registered for use.  Prior to the procedure, the pre-op planning station was used to plan our approach to the frontal and sphenoid sinuses.  Instruments were then calibrated and registered for use.     Septoplasty (48941):      1% lidocaine with 1:100,000 epinephrine was injected  enlarging the sphenoid ostium. Polyps were removed with a microdebrider.       Right Endoscopic Skull Base Dissection with tissue removal:   The medial orbital wall and anterior skull base skeletonized using a 30 degree scope using through biting forceps and kerrison rongeurs. With the skull base visible and free of disease the anterior ethmoid artery was identified and confirmed with the navigation instruments. Care was taken to ensure the skull base was not violated while periodically checking with the [employer] navigation.     Right Endoscopic Frontal Sinus Dissection with tissue removal (86672):   The frontal recess was dissected with a 70 degree endoscope and frontal recess instruments.  The superior uncinate process was resected and the agger nasi cell was dissected out.  The frontal recess was enlarged by removing the bony partition between the frontal recess and supraorbital ethmoid air cell.     Inferior Turbinate Reduction (09427):  A #15 blade was used to make an incision in the bilateral heads of the inferior turbinated. The mucosa was elevated with a iglesia elevator. The microdebrider was then used to perform a submucous resection bilaterally. The pyle elevator was then used to lateralize her inferior turbinates bilaterally.      Closure:  The mucopericondrial flaps where then re-approximated with a 3-0 Chromic suture at the anterior incision of the septum. The middle turbinates where bolgerized as well as the septum and the middle turbinates where sutured to the septum with a mattress stitch  using a 4-0 fast absorbing gut suture. Propel mini stent were placed in the bilateral frontal sinus recesses. The nasal cavities where irrigated and hemostasis was achieved. An OG tube was passed to reduce post operative nausea. This concluded the procedure and the patient was turned back over to the anesthesia team.      Abiodun Marrufo DO    Electronically signed by Abiodun Marrufo DO on 7/17/2024 at 2:26 PM

## 2024-07-17 NOTE — PROGRESS NOTES
D: Patient here for surgery, taken to bay [facility] in [facility], patient was able to undress self without difficulty. A: Pre op checklist completed and documented, assessment completed and documented, instructed to use call light for any needs, call light is in reach.

## 2024-07-17 NOTE — INTERVAL H&P NOTE
Update History & Physical    The patient's History and Physical of June 27, 2024 was reviewed with the patient and I examined the patient. There was no change. The surgical site was confirmed by the patient and me.     Plan: The risks, benefits, expected outcome, and alternative to the recommended procedure have been discussed with the patient. Patient understands and wants to proceed with the procedure.     Electronically signed by Abiodun Marrufo DO on 7/17/2024 at 12:07 PM

## 2024-07-17 NOTE — PROGRESS NOTES
D: Patient doing well after oral pain med,states pain is now a 3/10 and is tolerable, patient is getting dressed with help from family. A: Discharge instructions reviewed with patient and family who acknowledged understanding, instructed on dressing changes and provided with dressings and paper tape. Patient was discharged via wheelchair to private car and with all belongings.

## 2024-07-17 NOTE — DISCHARGE INSTRUCTIONS
have a nasal drip pad/sling with gauze to catch drainage from the front of your nose. The dressing may need to be changed frequently during the first 24 hours following surgery. In case of profuse nasal bleeding, you may apply ice to the bridge of the nose and pinch the nose just above the tip and hold for 10 minutes; if bleeding continues, contact the doctors office.  Started the day after sinus surgery use sinus irrigations at least 3 times per day (full bottle).  The most common way is to use Neomed sinus rinses.  Fill the bottle with distilled water and mixed the salt bicarbonate packet.  You can be seen at this website: [email or web address]   It is more comfortable to sleep with extra pillows or in a recliner for the first few days after surgery until the drainage begins to resolve.   Do not blow your nose for 2 weeks after surgery.  Avoid lifting > 10 lbs. and no vigorous exercise for 2 weeks after surgery.  Avoid airplane travel for 2 weeks following sinus surgery; the cabin pressure changes can cause pain and swelling within the nose/sinuses.  Sense of smell and taste are often diminished for several weeks after surgery. There may be some tenderness or numbness in your upper front teeth, which is normal after surgery. You may express old clot, discolored mucus or very large nasal crusts from your nose for up to 3-4 weeks after surgery; depending on how frequently and how effectively you irrigate your nose with the saltwater spray.  You may have absorbable sutures inside of your nose after surgery that will slowly dissolve in 2-3 weeks. Be careful when clearing crusts from the nose since they may be attached to these sutures.    Medications  Pain medication can be used for pain as prescribed. Pain and pressure in the nose is expected after surgery. As the surgical site heals, pain will resolve over the course of a week. Pain medications can cause nausea, which can be  prevented if you take them with food or milk.  You will be given an antibiotic for one week after surgery to treat any existing and prevent further infection. Take this medication with food to prevent nausea or vomiting.  You can use 2 nasal sprays after surgery: Afrin can be used up to 2 times a day for up to 5 days after surgery (best before bed) to reduce bloody drainage from the nose for the first few days after surgery.   Take all of your routine medications as prescribed, unless told otherwise by your surgeon. Any medications that thin the blood should be avoided. These include aspirin and aspirin-like products (Advil, Motrin, Excedrin, Alieve, Celebrex, Naprosyn).  SINUS IRRIGATIONS INSTRUCTIONS    Started the day after sinus surgery use sinus irrigations at least 3 times per day (full bottle).   Fill the bottle with distilled water and mixed the salt bicarbonate packet.  An instructional video can be seen at: https://www.youtube.com/watch?v=sWNea-ukIW0  The goal is to gently irrigate both nostrils so that any residual blood clots can be flushed from your nose.    If you start having ear pain after irrigations do not squeeze the bottle as firmly.

## 2024-07-17 NOTE — ANESTHESIA PRE PROCEDURE
Department of Anesthesiology  Preprocedure Note       Name:  Ilene Forrest   Age:  33 y.o.  :  1990                                          MRN:  [id number]         Date:  2024      Surgeon: Surgeon(s):  Abiodun Marrufo DO    Procedure: Procedure(s):  BILATERAL INFERIOR TURBINATE REDUCTION,  MAXILLARY ANTROSTOMY WITH TISSUE REMOVAL, TOTAL ETHMOID AND SPHENOID WITH TISSUE REMOVAL, FRONTAL SINUS EXPLORATION WITH IMAGE GUIDANCE, SEPTOPLASTY    Medications prior to admission:   Prior to Admission medications    Medication Sig Start Date End Date Taking? Authorizing Provider   azelastine (ASTELIN) 0.1 % nasal spray 1 spray by Nasal route 2 times daily Use in each nostril as directed    ProviderJaja MD   cetirizine (ZYRTEC) 10 MG tablet Take 1 tablet by mouth daily    ProviderJaja MD   fluticasone (FLONASE) 50 MCG/ACT nasal spray 1 spray by Each Nostril route daily    ProviderJaja MD   pantoprazole (PROTONIX) 20 MG tablet Take 1 tablet by mouth every morning (before breakfast) 24   Abiodun Marrufo DO       Current medications:    Current Facility-Administered Medications   Medication Dose Route Frequency Provider Last Rate Last Admin   • lidocaine PF 1 % injection 1 mL  1 mL IntraDERmal Once PRN Taz Cheung MD       • lactated ringers IV soln infusion   IntraVENous Continuous Taz Cheung MD       • sodium chloride flush 0.9 % injection 5-40 mL  5-40 mL IntraVENous 2 times per day Taz Cheung MD       • sodium chloride flush 0.9 % injection 5-40 mL  5-40 mL IntraVENous PRN Taz Cheung MD       • 0.9 % sodium chloride infusion   IntraVENous PRN Taz Cheung MD       • midazolam (VERSED) injection 2 mg  2 mg IntraVENous Once PRN Taz Cheung MD       • sodium chloride flush 0.9 % injection 5-40 mL  5-40 mL IntraVENous 2 times per day Abiodun Marrufo DO       • sodium chloride flush 0.9 % injection 
detailed exam

## 2024-07-18 LAB — HCG UR QL: NEGATIVE

## 2024-07-24 ASSESSMENT — ENCOUNTER SYMPTOMS
SHORTNESS OF BREATH: 0
VOICE CHANGE: 1
COUGH: 0
EYE PAIN: 0
VOMITING: 0
NAUSEA: 0
RHINORRHEA: 1

## 2024-07-24 NOTE — PROGRESS NOTES
[facility]  DIVISION OF OTOLARYNGOLOGY- HEAD & NECK SURGERY  CONSULT    Patient Name: Ilene Forrest  Medical Record Number:  [id number]  Primary Care Physician:  Everardo Sweeney APRN - CNP  Date of Consultation: 2024    Chief Complaint:   Chief Complaint   Patient presents with    Post-Op Check     Patient is being seen today for a post op appointment, she states that she does have a headache.       HISTORY OF PRESENT ILLNESS  Ilene is a(n) 33 y.o. female who presents for evaluation of sinus issues, sore throat, hoarseness, and eustachian tube dysfunction.  She has been given antibiotics Mucinex sinus rinses and steroids without relief.  She is also taking Flonase Astelin and Zyrtec.  She does not smoke nor has she ever.    Interval history 6/3/2024  Ilene states that she is just finishing cefdinir and steroids for a double ear infection and nasal congestion.  She continues to have facial pain and pressure despite treatment.  She is currently taking Flonase Astelin and Zyrtec.    Interval History 2024  CT performed on 2024 and presents for results.  She continues to have facial pain and pressure with nasal discharge.  She is also having some right-sided neck pain.    Interval History 24  Had sinus surgery on 2024.  She is having some left-sided headaches but otherwise doing well    Patient Active Problem List   Diagnosis    Pain in both hands    Right elbow pain    Arthritis of both hands    Right lateral epicondylitis    Chronic pansinusitis    Deviated nasal septum    Hypertrophy of nasal turbinates     Past Surgical History:   Procedure Laterality Date    APPENDECTOMY  2009     SECTION      CHOLECYSTECTOMY  2009    SINUS ENDOSCOPY Bilateral 2024    BILATERAL INFERIOR TURBINATE REDUCTION,  MAXILLARY ANTROSTOMY WITH TISSUE REMOVAL, TOTAL ETHMOID AND SPHENOID WITH TISSUE REMOVAL, FRONTAL SINUS EXPLORATION WITH IMAGE GUIDANCE, SEPTOPLASTY performed by

## 2024-07-25 ENCOUNTER — OFFICE VISIT (OUTPATIENT)
Dept: ENT CLINIC | Age: 34
End: 2024-07-25
Payer: COMMERCIAL

## 2024-07-25 VITALS
BODY MASS INDEX: 51.65 KG/M2 | HEART RATE: 88 BPM | RESPIRATION RATE: 16 BRPM | HEIGHT: 66 IN | SYSTOLIC BLOOD PRESSURE: 145 MMHG | DIASTOLIC BLOOD PRESSURE: 91 MMHG

## 2024-07-25 DIAGNOSIS — J30.2 SEASONAL ALLERGIES: ICD-10-CM

## 2024-07-25 DIAGNOSIS — J32.4 CHRONIC PANSINUSITIS: Primary | ICD-10-CM

## 2024-07-25 PROCEDURE — 99024 POSTOP FOLLOW-UP VISIT: CPT | Performed by: STUDENT IN AN ORGANIZED HEALTH CARE EDUCATION/TRAINING PROGRAM

## 2024-07-25 PROCEDURE — 31237 NSL/SINS NDSC SURG BX POLYPC: CPT | Performed by: STUDENT IN AN ORGANIZED HEALTH CARE EDUCATION/TRAINING PROGRAM

## 2024-08-05 ENCOUNTER — TELEPHONE (OUTPATIENT)
Dept: ENT CLINIC | Age: 34
End: 2024-08-05

## 2024-08-05 NOTE — TELEPHONE ENCOUNTER
Patient had to cancel allergy testing. Forgot to stop taking Xyzal 7 days before. Will call to reschedule.

## 2024-08-07 ENCOUNTER — TELEPHONE (OUTPATIENT)
Dept: ENT CLINIC | Age: 34
End: 2024-08-07

## 2024-08-08 NOTE — TELEPHONE ENCOUNTER
Called pt and let her know that our office would give her a call in mid to late August to schedule for September.

## 2024-08-10 ENCOUNTER — PATIENT MESSAGE (OUTPATIENT)
Dept: ENT CLINIC | Age: 34
End: 2024-08-10

## 2024-08-12 RX ORDER — AZELASTINE 1 MG/ML
1 SPRAY, METERED NASAL 2 TIMES DAILY
Qty: 30 ML | Refills: 3 | Status: SHIPPED | OUTPATIENT
Start: 2024-08-12

## 2024-08-20 ENCOUNTER — OFFICE VISIT (OUTPATIENT)
Dept: ENT CLINIC | Age: 34
End: 2024-08-20
Payer: COMMERCIAL

## 2024-08-20 VITALS
BODY MASS INDEX: 47.09 KG/M2 | WEIGHT: 293 LBS | HEART RATE: 89 BPM | SYSTOLIC BLOOD PRESSURE: 127 MMHG | DIASTOLIC BLOOD PRESSURE: 85 MMHG | HEIGHT: 66 IN

## 2024-08-20 DIAGNOSIS — J32.4 CHRONIC PANSINUSITIS: Primary | ICD-10-CM

## 2024-08-20 DIAGNOSIS — J30.2 SEASONAL ALLERGIES: ICD-10-CM

## 2024-08-20 PROCEDURE — 99214 OFFICE O/P EST MOD 30 MIN: CPT | Performed by: STUDENT IN AN ORGANIZED HEALTH CARE EDUCATION/TRAINING PROGRAM

## 2024-08-20 PROCEDURE — 31237 NSL/SINS NDSC SURG BX POLYPC: CPT | Performed by: STUDENT IN AN ORGANIZED HEALTH CARE EDUCATION/TRAINING PROGRAM

## 2024-08-20 RX ORDER — DOXYCYCLINE HYCLATE 100 MG
100 TABLET ORAL 2 TIMES DAILY
Qty: 20 TABLET | Refills: 0 | Status: SHIPPED | OUTPATIENT
Start: 2024-08-20 | End: 2024-08-30

## 2024-08-20 RX ORDER — PREDNISONE 20 MG/1
20 TABLET ORAL DAILY
Qty: 5 TABLET | Refills: 0 | Status: SHIPPED | OUTPATIENT
Start: 2024-08-20 | End: 2024-08-25

## 2024-08-20 ASSESSMENT — ENCOUNTER SYMPTOMS
VOICE CHANGE: 0
EYE PAIN: 0
SINUS PRESSURE: 1
RHINORRHEA: 1
VOMITING: 0
SINUS PAIN: 1
SHORTNESS OF BREATH: 0
NAUSEA: 0
COUGH: 0

## 2024-08-20 NOTE — PROGRESS NOTES
Stayed outside house: Not on file     Unable to get utilities: Not on file     DRUG/FOOD ALLERGIES: Patient has no known allergies.  CURRENT MEDICATIONS  Prior to Admission medications    Medication Sig Start Date End Date Taking? Authorizing Provider   doxycycline hyclate (VIBRA-TABS) 100 MG tablet Take 1 tablet by mouth 2 times daily for 10 days 8/20/24 8/30/24 Yes Abiodun Marrufo DO   predniSONE (DELTASONE) 20 MG tablet Take 1 tablet by mouth daily for 5 days 8/20/24 8/25/24 Yes Abiodun Marrufo DO   Fluticasone Propionate 93 MCG/ACT EXHU Spray 1 spray BID 8/20/24  Yes Abiodun Marrufo DO   azelastine (ASTELIN) 0.1 % nasal spray 1 spray by Nasal route 2 times daily Use in each nostril as directed 8/12/24  Yes Abiodun Marrufo DO   azelastine (ASTELIN) 0.1 % nasal spray 1 spray by Nasal route 2 times daily Use in each nostril as directed   Yes Provider, MD Jaja   cetirizine (ZYRTEC) 10 MG tablet Take 1 tablet by mouth daily   Yes ProviderJaja MD   fluticasone (FLONASE) 50 MCG/ACT nasal spray 1 spray by Each Nostril route daily   Yes Provider, MD Jaja   pantoprazole (PROTONIX) 20 MG tablet Take 1 tablet by mouth every morning (before breakfast) 4/2/24  Yes Abiodun Marrufo DO     REVIEW OF SYSTEMS  The following systems were reviewed and revealed the following in addition to any already discussed in the HPI:  Review of Systems   Constitutional:  Negative for fatigue and fever.   HENT:  Positive for ear pain, postnasal drip, rhinorrhea, sinus pressure and sinus pain. Negative for congestion, sneezing and voice change.    Eyes:  Negative for pain and visual disturbance.   Respiratory:  Negative for cough and shortness of breath.    Cardiovascular:  Negative for chest pain.   Gastrointestinal:  Negative for nausea and vomiting.   Endocrine: Negative.    Genitourinary: Negative.    Musculoskeletal:  Negative for neck pain and neck stiffness.   Skin:  Negative for rash.   Neurological:  Negative for dizziness

## 2024-08-23 ENCOUNTER — TELEPHONE (OUTPATIENT)
Dept: ENT CLINIC | Age: 34
End: 2024-08-23

## 2024-09-09 ENCOUNTER — OFFICE VISIT (OUTPATIENT)
Dept: ENT CLINIC | Age: 34
End: 2024-09-09
Payer: COMMERCIAL

## 2024-09-09 VITALS
HEART RATE: 85 BPM | DIASTOLIC BLOOD PRESSURE: 94 MMHG | HEIGHT: 66 IN | SYSTOLIC BLOOD PRESSURE: 139 MMHG | BODY MASS INDEX: 47.09 KG/M2 | WEIGHT: 293 LBS

## 2024-09-09 DIAGNOSIS — J30.2 SEASONAL ALLERGIES: ICD-10-CM

## 2024-09-09 DIAGNOSIS — J32.4 CHRONIC PANSINUSITIS: Primary | ICD-10-CM

## 2024-09-09 PROCEDURE — 99213 OFFICE O/P EST LOW 20 MIN: CPT | Performed by: STUDENT IN AN ORGANIZED HEALTH CARE EDUCATION/TRAINING PROGRAM

## 2024-09-09 PROCEDURE — 31231 NASAL ENDOSCOPY DX: CPT | Performed by: STUDENT IN AN ORGANIZED HEALTH CARE EDUCATION/TRAINING PROGRAM

## 2024-09-12 ENCOUNTER — NURSE ONLY (OUTPATIENT)
Dept: ENT CLINIC | Age: 34
End: 2024-09-12
Payer: COMMERCIAL

## 2024-09-12 ENCOUNTER — OFFICE VISIT (OUTPATIENT)
Dept: ENT CLINIC | Age: 34
End: 2024-09-12
Payer: COMMERCIAL

## 2024-09-12 VITALS
WEIGHT: 293 LBS | HEART RATE: 82 BPM | BODY MASS INDEX: 47.09 KG/M2 | HEIGHT: 66 IN | DIASTOLIC BLOOD PRESSURE: 85 MMHG | SYSTOLIC BLOOD PRESSURE: 127 MMHG

## 2024-09-12 VITALS
TEMPERATURE: 98.6 F | SYSTOLIC BLOOD PRESSURE: 144 MMHG | DIASTOLIC BLOOD PRESSURE: 94 MMHG | HEART RATE: 97 BPM | OXYGEN SATURATION: 100 %

## 2024-09-12 DIAGNOSIS — J30.2 SEASONAL ALLERGIES: ICD-10-CM

## 2024-09-12 DIAGNOSIS — J30.2 SEASONAL ALLERGIES: Primary | ICD-10-CM

## 2024-09-12 DIAGNOSIS — J32.4 CHRONIC PANSINUSITIS: Primary | ICD-10-CM

## 2024-09-12 PROCEDURE — 95004 PERQ TESTS W/ALRGNC XTRCS: CPT | Performed by: STUDENT IN AN ORGANIZED HEALTH CARE EDUCATION/TRAINING PROGRAM

## 2024-09-12 PROCEDURE — 95024 IQ TESTS W/ALLERGENIC XTRCS: CPT | Performed by: STUDENT IN AN ORGANIZED HEALTH CARE EDUCATION/TRAINING PROGRAM

## 2024-09-12 PROCEDURE — 99214 OFFICE O/P EST MOD 30 MIN: CPT | Performed by: STUDENT IN AN ORGANIZED HEALTH CARE EDUCATION/TRAINING PROGRAM

## 2024-09-12 RX ORDER — DOXYCYCLINE HYCLATE 100 MG
100 TABLET ORAL 2 TIMES DAILY
Qty: 20 TABLET | Refills: 0 | Status: SHIPPED | OUTPATIENT
Start: 2024-09-12 | End: 2024-09-22

## 2024-09-12 RX ORDER — METHYLPREDNISOLONE 4 MG
TABLET, DOSE PACK ORAL
Qty: 1 KIT | Refills: 0 | Status: SHIPPED | OUTPATIENT
Start: 2024-09-12 | End: 2024-09-18

## 2024-09-12 RX ORDER — EPINEPHRINE 0.3 MG/.3ML
0.3 INJECTION SUBCUTANEOUS ONCE
Qty: 0.3 ML | Refills: 0 | Status: SHIPPED | OUTPATIENT
Start: 2024-09-12 | End: 2024-09-12

## 2024-09-12 ASSESSMENT — ENCOUNTER SYMPTOMS
EYE PAIN: 0
SINUS PAIN: 1
SINUS PRESSURE: 1
COUGH: 0
RHINORRHEA: 1
NAUSEA: 0
VOICE CHANGE: 0
VOMITING: 0
SHORTNESS OF BREATH: 0

## 2024-09-17 ENCOUNTER — NURSE ONLY (OUTPATIENT)
Dept: ENT CLINIC | Age: 34
End: 2024-09-17
Payer: COMMERCIAL

## 2024-09-17 DIAGNOSIS — J30.2 SEASONAL ALLERGIES: Primary | ICD-10-CM

## 2024-09-17 PROCEDURE — 95165 ANTIGEN THERAPY SERVICES: CPT | Performed by: OTOLARYNGOLOGY

## 2024-09-17 PROCEDURE — 95117 IMMUNOTHERAPY INJECTIONS: CPT | Performed by: OTOLARYNGOLOGY

## 2024-09-24 ENCOUNTER — NURSE ONLY (OUTPATIENT)
Dept: ENT CLINIC | Age: 34
End: 2024-09-24
Payer: COMMERCIAL

## 2024-09-24 DIAGNOSIS — J30.2 SEASONAL ALLERGIES: Primary | ICD-10-CM

## 2024-09-24 PROCEDURE — 95117 IMMUNOTHERAPY INJECTIONS: CPT | Performed by: OTOLARYNGOLOGY

## 2024-09-29 DIAGNOSIS — K21.9 LARYNGOPHARYNGEAL REFLUX (LPR): ICD-10-CM

## 2024-09-30 RX ORDER — PANTOPRAZOLE SODIUM 20 MG/1
20 TABLET, DELAYED RELEASE ORAL
Qty: 90 TABLET | Refills: 1 | Status: SHIPPED | OUTPATIENT
Start: 2024-09-30

## 2024-10-01 ENCOUNTER — NURSE ONLY (OUTPATIENT)
Dept: ENT CLINIC | Age: 34
End: 2024-10-01
Payer: COMMERCIAL

## 2024-10-01 DIAGNOSIS — J30.2 SEASONAL ALLERGIES: Primary | ICD-10-CM

## 2024-10-01 PROCEDURE — 95117 IMMUNOTHERAPY INJECTIONS: CPT | Performed by: OTOLARYNGOLOGY

## 2024-10-01 NOTE — PROGRESS NOTES
Correct serum vials verified by patient and nurse. After obtaining consent, and per orders of Dr Marrufo, injections of allergy serum given by JULIO CESAR Mack. Patient instructed to remain in clinic for 30 minutes after injection, and to report any adverse reactions to me immediately. No change in patient status post injection.

## 2024-10-08 ENCOUNTER — NURSE ONLY (OUTPATIENT)
Dept: ENT CLINIC | Age: 34
End: 2024-10-08
Payer: COMMERCIAL

## 2024-10-08 ENCOUNTER — OFFICE VISIT (OUTPATIENT)
Dept: ENT CLINIC | Age: 34
End: 2024-10-08
Payer: COMMERCIAL

## 2024-10-08 VITALS
HEART RATE: 80 BPM | HEIGHT: 66 IN | BODY MASS INDEX: 47.09 KG/M2 | DIASTOLIC BLOOD PRESSURE: 95 MMHG | WEIGHT: 293 LBS | SYSTOLIC BLOOD PRESSURE: 152 MMHG

## 2024-10-08 DIAGNOSIS — J33.9 NASAL POLYPS: ICD-10-CM

## 2024-10-08 DIAGNOSIS — J30.2 SEASONAL ALLERGIES: ICD-10-CM

## 2024-10-08 DIAGNOSIS — J30.2 SEASONAL ALLERGIES: Primary | ICD-10-CM

## 2024-10-08 DIAGNOSIS — J32.4 CHRONIC PANSINUSITIS: Primary | ICD-10-CM

## 2024-10-08 PROCEDURE — 95117 IMMUNOTHERAPY INJECTIONS: CPT | Performed by: OTOLARYNGOLOGY

## 2024-10-08 PROCEDURE — 99214 OFFICE O/P EST MOD 30 MIN: CPT | Performed by: STUDENT IN AN ORGANIZED HEALTH CARE EDUCATION/TRAINING PROGRAM

## 2024-10-08 PROCEDURE — 31231 NASAL ENDOSCOPY DX: CPT | Performed by: STUDENT IN AN ORGANIZED HEALTH CARE EDUCATION/TRAINING PROGRAM

## 2024-10-08 RX ORDER — METHYLPREDNISOLONE 4 MG
TABLET, DOSE PACK ORAL
Qty: 1 KIT | Refills: 0 | Status: SHIPPED | OUTPATIENT
Start: 2024-10-08 | End: 2024-10-14

## 2024-10-08 RX ORDER — BUDESONIDE 0.25 MG/2ML
1 INHALANT ORAL 2 TIMES DAILY
Qty: 30 EACH | Refills: 2 | Status: SHIPPED | OUTPATIENT
Start: 2024-10-08

## 2024-10-08 ASSESSMENT — ENCOUNTER SYMPTOMS
VOICE CHANGE: 0
EYE PAIN: 0
COUGH: 0
VOMITING: 0
SINUS PAIN: 1
RHINORRHEA: 0
SINUS PRESSURE: 1
NAUSEA: 0
SHORTNESS OF BREATH: 0

## 2024-10-08 NOTE — PROGRESS NOTES
Correct serum vials verified by patient and nurse. After obtaining consent, and per orders of Dr Marrufo, injections of allergy serum given by CYNTHIA Gonzalez. Patient instructed to remain in clinic for 30 minutes after injection, and to report any adverse reactions to me immediately. No change in patient status post injection.

## 2024-10-08 NOTE — PROGRESS NOTES
[facility]  DIVISION OF OTOLARYNGOLOGY- HEAD & NECK SURGERY  CONSULT    Patient Name: Ilene Forrest  Medical Record Number:  [id number]  Primary Care Physician:  Everardo Sweeney, APRN - CNP  Date of Consultation: 10/8/2024    Chief Complaint:   Chief Complaint   Patient presents with    Sinus Problem     3 wk f/u; started allergy shots x 4 seems better.  Still some tenderness on the right side.        HISTORY OF PRESENT ILLNESS  Ilene is a(n) 33 y.o. female who presents for evaluation of sinus issues, sore throat, hoarseness, and eustachian tube dysfunction.  She has been given antibiotics Mucinex sinus rinses and steroids without relief.  She is also taking Flonase Astelin and Zyrtec.  She does not smoke nor has she ever.    Interval history 6/3/2024  Ilene states that she is just finishing cefdinir and steroids for a double ear infection and nasal congestion.  She continues to have facial pain and pressure despite treatment.  She is currently taking Flonase Astelin and Zyrtec.    Interval History 6/27/2024  CT performed on 6/18/2024 and presents for results.  She continues to have facial pain and pressure with nasal discharge.  She is also having some right-sided neck pain.    Interval History 07/25/24  Had sinus surgery on 7/17/2024.  She is having some left-sided headaches but otherwise doing well    Interval History 08/20/24  She was doing well until approximately 3 to 4 days ago when she noted facial pain and pressure after sleeping with the windows open.  She continued to take her Zyrtec Astelin and Flonase but has not been able to have her allergy testing performed yet    Interval History 09/09/24  She is still having facial pain and pressure.  After being treated with antibiotics and steroids she developed COVID.  She is scheduled for allergy testing later this week.    Interval History 10/08/24  She has started her shots and states that she is doing better.  Using Xhance.  Still some pressure on

## 2024-10-15 ENCOUNTER — NURSE ONLY (OUTPATIENT)
Dept: ENT CLINIC | Age: 34
End: 2024-10-15
Payer: COMMERCIAL

## 2024-10-15 DIAGNOSIS — J30.2 SEASONAL ALLERGIES: Primary | ICD-10-CM

## 2024-10-15 PROCEDURE — 95117 IMMUNOTHERAPY INJECTIONS: CPT | Performed by: OTOLARYNGOLOGY

## 2024-10-22 ENCOUNTER — NURSE ONLY (OUTPATIENT)
Dept: ENT CLINIC | Age: 34
End: 2024-10-22
Payer: COMMERCIAL

## 2024-10-22 DIAGNOSIS — J30.2 SEASONAL ALLERGIES: Primary | ICD-10-CM

## 2024-10-22 PROCEDURE — 95117 IMMUNOTHERAPY INJECTIONS: CPT | Performed by: OTOLARYNGOLOGY

## 2024-10-25 ENCOUNTER — TELEPHONE (OUTPATIENT)
Dept: ENT CLINIC | Age: 34
End: 2024-10-25

## 2024-10-29 ENCOUNTER — NURSE ONLY (OUTPATIENT)
Dept: ENT CLINIC | Age: 34
End: 2024-10-29

## 2024-10-29 ENCOUNTER — NURSE ONLY (OUTPATIENT)
Dept: ENT CLINIC | Age: 34
End: 2024-10-29
Payer: COMMERCIAL

## 2024-10-29 DIAGNOSIS — J30.2 SEASONAL ALLERGIES: Primary | ICD-10-CM

## 2024-10-29 PROCEDURE — 95165 ANTIGEN THERAPY SERVICES: CPT | Performed by: OTOLARYNGOLOGY

## 2024-10-29 PROCEDURE — 95117 IMMUNOTHERAPY INJECTIONS: CPT | Performed by: OTOLARYNGOLOGY

## 2024-11-05 ENCOUNTER — NURSE ONLY (OUTPATIENT)
Dept: ENT CLINIC | Age: 34
End: 2024-11-05
Payer: COMMERCIAL

## 2024-11-05 DIAGNOSIS — J30.2 SEASONAL ALLERGIES: Primary | ICD-10-CM

## 2024-11-05 PROCEDURE — 95117 IMMUNOTHERAPY INJECTIONS: CPT | Performed by: OTOLARYNGOLOGY

## 2024-11-05 NOTE — PROGRESS NOTES
Correct serum vials verified by patient and nurse. Consent obtained and SVT completed.  SVT P=9mm wheal, M=9mm wheal.  After obtaining consent, and per orders of Dr Marrufo, injections of allergy serum given by CYNTHIA Gonzalez. Patient instructed to remain in clinic for 30 minutes after injection, and to report any adverse reactions to me immediately. No change in patient status post injection.

## 2024-11-11 ENCOUNTER — OFFICE VISIT (OUTPATIENT)
Dept: ENT CLINIC | Age: 34
End: 2024-11-11
Payer: COMMERCIAL

## 2024-11-11 VITALS
DIASTOLIC BLOOD PRESSURE: 97 MMHG | WEIGHT: 293 LBS | HEART RATE: 81 BPM | BODY MASS INDEX: 47.09 KG/M2 | HEIGHT: 66 IN | SYSTOLIC BLOOD PRESSURE: 150 MMHG

## 2024-11-11 DIAGNOSIS — J30.2 SEASONAL ALLERGIES: ICD-10-CM

## 2024-11-11 DIAGNOSIS — J32.4 CHRONIC PANSINUSITIS: Primary | ICD-10-CM

## 2024-11-11 DIAGNOSIS — J33.9 NASAL POLYPS: ICD-10-CM

## 2024-11-11 PROCEDURE — 31231 NASAL ENDOSCOPY DX: CPT | Performed by: STUDENT IN AN ORGANIZED HEALTH CARE EDUCATION/TRAINING PROGRAM

## 2024-11-11 PROCEDURE — 99214 OFFICE O/P EST MOD 30 MIN: CPT | Performed by: STUDENT IN AN ORGANIZED HEALTH CARE EDUCATION/TRAINING PROGRAM

## 2024-11-11 RX ORDER — BUDESONIDE 0.25 MG/2ML
1 INHALANT ORAL 2 TIMES DAILY
Qty: 120 ML | Refills: 3 | Status: SHIPPED | OUTPATIENT
Start: 2024-11-11 | End: 2024-12-11

## 2024-11-11 ASSESSMENT — ENCOUNTER SYMPTOMS
RHINORRHEA: 0
SHORTNESS OF BREATH: 0
EYE PAIN: 0
SINUS PAIN: 1
VOICE CHANGE: 0
SINUS PRESSURE: 1
NAUSEA: 0
COUGH: 0
VOMITING: 0

## 2024-11-11 NOTE — PROGRESS NOTES
[facility]  DIVISION OF OTOLARYNGOLOGY- HEAD & NECK SURGERY  CONSULT    Patient Name: Ilene Forrest  Medical Record Number:  [id number]  Primary Care Physician:  Everardo Sweeney, APRN - CNP  Date of Consultation: 11/11/2024    Chief Complaint:   Chief Complaint   Patient presents with    Sinus Problem     Follow up on allergy shots       HISTORY OF PRESENT ILLNESS  Ilene is a(n) 33 y.o. female who presents for evaluation of sinus issues, sore throat, hoarseness, and eustachian tube dysfunction.  She has been given antibiotics Mucinex sinus rinses and steroids without relief.  She is also taking Flonase Astelin and Zyrtec.  She does not smoke nor has she ever.    Interval history 6/3/2024  Ilene states that she is just finishing cefdinir and steroids for a double ear infection and nasal congestion.  She continues to have facial pain and pressure despite treatment.  She is currently taking Flonase Astelin and Zyrtec.    Interval History 6/27/2024  CT performed on 6/18/2024 and presents for results.  She continues to have facial pain and pressure with nasal discharge.  She is also having some right-sided neck pain.    Interval History 07/25/24  Had sinus surgery on 7/17/2024.  She is having some left-sided headaches but otherwise doing well    Interval History 08/20/24  She was doing well until approximately 3 to 4 days ago when she noted facial pain and pressure after sleeping with the windows open.  She continued to take her Zyrtec Astelin and Flonase but has not been able to have her allergy testing performed yet    Interval History 09/09/24  She is still having facial pain and pressure.  After being treated with antibiotics and steroids she developed COVID.  She is scheduled for allergy testing later this week.    Interval History 10/08/24  She has started her shots and states that she is doing better.  Using Xhance.  Still some pressure on the right side at times    Interval History 11/11/24  She has been

## 2024-11-12 ENCOUNTER — NURSE ONLY (OUTPATIENT)
Dept: ENT CLINIC | Age: 34
End: 2024-11-12
Payer: COMMERCIAL

## 2024-11-12 DIAGNOSIS — J30.2 SEASONAL ALLERGIES: Primary | ICD-10-CM

## 2024-11-12 PROCEDURE — 95117 IMMUNOTHERAPY INJECTIONS: CPT | Performed by: OTOLARYNGOLOGY

## 2024-11-19 ENCOUNTER — NURSE ONLY (OUTPATIENT)
Dept: ENT CLINIC | Age: 34
End: 2024-11-19
Payer: COMMERCIAL

## 2024-11-19 DIAGNOSIS — J30.2 SEASONAL ALLERGIES: Primary | ICD-10-CM

## 2024-11-19 PROCEDURE — 95117 IMMUNOTHERAPY INJECTIONS: CPT | Performed by: OTOLARYNGOLOGY

## 2024-11-26 ENCOUNTER — NURSE ONLY (OUTPATIENT)
Dept: ENT CLINIC | Age: 34
End: 2024-11-26
Payer: COMMERCIAL

## 2024-11-26 DIAGNOSIS — J30.2 SEASONAL ALLERGIES: Primary | ICD-10-CM

## 2024-11-26 PROCEDURE — 95117 IMMUNOTHERAPY INJECTIONS: CPT | Performed by: OTOLARYNGOLOGY

## 2024-12-05 ENCOUNTER — TELEPHONE (OUTPATIENT)
Dept: ENT CLINIC | Age: 34
End: 2024-12-05

## 2024-12-10 ENCOUNTER — NURSE ONLY (OUTPATIENT)
Dept: ENT CLINIC | Age: 34
End: 2024-12-10

## 2024-12-10 ENCOUNTER — NURSE ONLY (OUTPATIENT)
Dept: ENT CLINIC | Age: 34
End: 2024-12-10
Payer: COMMERCIAL

## 2024-12-10 DIAGNOSIS — J30.2 SEASONAL ALLERGIES: Primary | ICD-10-CM

## 2024-12-10 PROCEDURE — 95165 ANTIGEN THERAPY SERVICES: CPT | Performed by: OTOLARYNGOLOGY

## 2024-12-10 PROCEDURE — 95117 IMMUNOTHERAPY INJECTIONS: CPT | Performed by: OTOLARYNGOLOGY

## 2024-12-10 NOTE — PROGRESS NOTES
Total vials prepared: 2. First vial contains Pollens with 12 doses and second vial contains Mites, Molds, Epithelia, and Insects (M,M,Epi,I) with 12 doses.  Allergy extract was prepared according to written prescription by provider.  Provider onsite during allergy extract preparation.  Patient vials labeled with name, date of birth, vial number, Pollen or M M Epi I, and expiration date.  Injections are given weekly according to provider orders and injections are built according to patient tolerance to achieve a goal of maintenance.  See media scan for Prescription Treatment Set. See allergy flowsheets for injection documentation from each visit.

## 2024-12-17 ENCOUNTER — NURSE ONLY (OUTPATIENT)
Dept: ENT CLINIC | Age: 34
End: 2024-12-17
Payer: COMMERCIAL

## 2024-12-17 DIAGNOSIS — J30.2 SEASONAL ALLERGIES: Primary | ICD-10-CM

## 2024-12-17 PROCEDURE — 95117 IMMUNOTHERAPY INJECTIONS: CPT | Performed by: OTOLARYNGOLOGY

## 2024-12-24 ENCOUNTER — NURSE ONLY (OUTPATIENT)
Dept: ENT CLINIC | Age: 34
End: 2024-12-24
Payer: COMMERCIAL

## 2024-12-24 DIAGNOSIS — J30.2 SEASONAL ALLERGIES: Primary | ICD-10-CM

## 2024-12-24 PROCEDURE — 95117 IMMUNOTHERAPY INJECTIONS: CPT | Performed by: OTOLARYNGOLOGY

## 2025-01-14 ENCOUNTER — NURSE ONLY (OUTPATIENT)
Dept: ENT CLINIC | Age: 35
End: 2025-01-14
Payer: COMMERCIAL

## 2025-01-14 DIAGNOSIS — J30.2 SEASONAL ALLERGIES: Primary | ICD-10-CM

## 2025-01-14 PROCEDURE — 95117 IMMUNOTHERAPY INJECTIONS: CPT | Performed by: OTOLARYNGOLOGY

## 2025-01-14 NOTE — PROGRESS NOTES
Correct serum vials verified by patient and nurse. Consent obtained and SVT completed.  SVT P=8mm wheal, M=9mm wheal.  After obtaining consent, and per orders of Dr Marrufo, injections of allergy serum given by CYNTHIA Gonzalez. Patient instructed to remain in clinic for 30 minutes after injection, and to report any adverse reactions to me immediately. No change in patient status post injection.

## 2025-01-21 ENCOUNTER — NURSE ONLY (OUTPATIENT)
Dept: ENT CLINIC | Age: 35
End: 2025-01-21
Payer: COMMERCIAL

## 2025-01-21 DIAGNOSIS — J30.2 SEASONAL ALLERGIES: Primary | ICD-10-CM

## 2025-01-21 PROCEDURE — 95117 IMMUNOTHERAPY INJECTIONS: CPT | Performed by: OTOLARYNGOLOGY

## 2025-01-21 NOTE — PROGRESS NOTES
Correct serum vials verified by patient and nurse. After obtaining consent, and per orders of Dr Marrufo, injections of allergy serum given by JULIO CESAR Mack. Patient instructed to remain in clinic for 30 minutes after injection, and to report any adverse reactions to me immediately. No change in patient status post injection.   Recommended observation.

## 2025-01-28 ENCOUNTER — NURSE ONLY (OUTPATIENT)
Dept: ENT CLINIC | Age: 35
End: 2025-01-28
Payer: COMMERCIAL

## 2025-01-28 DIAGNOSIS — J30.2 SEASONAL ALLERGIES: Primary | ICD-10-CM

## 2025-01-28 PROCEDURE — 95117 IMMUNOTHERAPY INJECTIONS: CPT | Performed by: OTOLARYNGOLOGY

## 2025-02-04 ENCOUNTER — NURSE ONLY (OUTPATIENT)
Dept: ENT CLINIC | Age: 35
End: 2025-02-04
Payer: COMMERCIAL

## 2025-02-04 DIAGNOSIS — J30.2 SEASONAL ALLERGIES: Primary | ICD-10-CM

## 2025-02-04 PROCEDURE — 95117 IMMUNOTHERAPY INJECTIONS: CPT | Performed by: OTOLARYNGOLOGY

## 2025-02-11 ENCOUNTER — NURSE ONLY (OUTPATIENT)
Dept: ENT CLINIC | Age: 35
End: 2025-02-11
Payer: COMMERCIAL

## 2025-02-11 DIAGNOSIS — J30.2 SEASONAL ALLERGIES: Primary | ICD-10-CM

## 2025-02-11 PROCEDURE — 95117 IMMUNOTHERAPY INJECTIONS: CPT | Performed by: OTOLARYNGOLOGY

## 2025-02-18 ENCOUNTER — NURSE ONLY (OUTPATIENT)
Dept: ENT CLINIC | Age: 35
End: 2025-02-18
Payer: COMMERCIAL

## 2025-02-18 DIAGNOSIS — J30.2 SEASONAL ALLERGIES: Primary | ICD-10-CM

## 2025-02-18 PROCEDURE — 95117 IMMUNOTHERAPY INJECTIONS: CPT | Performed by: OTOLARYNGOLOGY

## 2025-02-25 ENCOUNTER — NURSE ONLY (OUTPATIENT)
Dept: ENT CLINIC | Age: 35
End: 2025-02-25
Payer: COMMERCIAL

## 2025-02-25 DIAGNOSIS — J30.2 SEASONAL ALLERGIES: Primary | ICD-10-CM

## 2025-02-25 PROCEDURE — 95117 IMMUNOTHERAPY INJECTIONS: CPT | Performed by: OTOLARYNGOLOGY

## 2025-03-04 ENCOUNTER — NURSE ONLY (OUTPATIENT)
Dept: ENT CLINIC | Age: 35
End: 2025-03-04
Payer: COMMERCIAL

## 2025-03-04 ENCOUNTER — TELEPHONE (OUTPATIENT)
Dept: ENT CLINIC | Age: 35
End: 2025-03-04

## 2025-03-04 DIAGNOSIS — J30.2 SEASONAL ALLERGIES: Primary | ICD-10-CM

## 2025-03-04 PROCEDURE — 95117 IMMUNOTHERAPY INJECTIONS: CPT | Performed by: OTOLARYNGOLOGY

## 2025-03-04 NOTE — TELEPHONE ENCOUNTER
Patient has been tolerating allergy immunotherapy injections without incident.  Recommend continuing on the Maintenance dosing.  Will mix refill of Maintenance Prescription Treatment Sets.  Please advise.

## 2025-03-07 ENCOUNTER — NURSE ONLY (OUTPATIENT)
Dept: ENT CLINIC | Age: 35
End: 2025-03-07

## 2025-03-07 DIAGNOSIS — J30.2 SEASONAL ALLERGIES: Primary | ICD-10-CM

## 2025-03-07 NOTE — PROGRESS NOTES
Total vials prepared: 2. First vial contains Pollens with 10 doses and second vial contains Mites, Molds, Epithelia, and Insects (M,M,Epi,I) with 10 doses.  Allergy extract was prepared according to written prescription by provider.  Provider onsite during allergy extract preparation.  Patient vials labeled with name, date of birth, vial number, Pollen or M M Epi I, and expiration date.  Injections are given weekly according to provider orders and injections are built according to patient tolerance to achieve a goal of maintenance.  See media scan for Prescription Treatment Set. See allergy flowsheets for injection documentation from each visit.

## 2025-03-11 ENCOUNTER — NURSE ONLY (OUTPATIENT)
Dept: ENT CLINIC | Age: 35
End: 2025-03-11
Payer: COMMERCIAL

## 2025-03-11 DIAGNOSIS — J30.2 SEASONAL ALLERGIES: Primary | ICD-10-CM

## 2025-03-11 PROCEDURE — 95117 IMMUNOTHERAPY INJECTIONS: CPT | Performed by: OTOLARYNGOLOGY

## 2025-03-18 ENCOUNTER — NURSE ONLY (OUTPATIENT)
Dept: ENT CLINIC | Age: 35
End: 2025-03-18
Payer: COMMERCIAL

## 2025-03-18 DIAGNOSIS — J30.2 SEASONAL ALLERGIES: Primary | ICD-10-CM

## 2025-03-18 PROCEDURE — 95117 IMMUNOTHERAPY INJECTIONS: CPT | Performed by: OTOLARYNGOLOGY

## 2025-03-27 ENCOUNTER — CLINICAL SUPPORT (OUTPATIENT)
Dept: ENT CLINIC | Age: 35
End: 2025-03-27
Payer: COMMERCIAL

## 2025-03-27 DIAGNOSIS — J30.2 SEASONAL ALLERGIES: Primary | ICD-10-CM

## 2025-03-27 PROCEDURE — 95117 IMMUNOTHERAPY INJECTIONS: CPT | Performed by: STUDENT IN AN ORGANIZED HEALTH CARE EDUCATION/TRAINING PROGRAM

## 2025-04-01 ENCOUNTER — CLINICAL SUPPORT (OUTPATIENT)
Dept: ENT CLINIC | Age: 35
End: 2025-04-01
Payer: COMMERCIAL

## 2025-04-01 DIAGNOSIS — J30.2 SEASONAL ALLERGIES: Primary | ICD-10-CM

## 2025-04-01 PROCEDURE — 95117 IMMUNOTHERAPY INJECTIONS: CPT | Performed by: STUDENT IN AN ORGANIZED HEALTH CARE EDUCATION/TRAINING PROGRAM

## 2025-04-08 ENCOUNTER — CLINICAL SUPPORT (OUTPATIENT)
Dept: ENT CLINIC | Age: 35
End: 2025-04-08
Payer: COMMERCIAL

## 2025-04-08 DIAGNOSIS — J30.2 SEASONAL ALLERGIES: Primary | ICD-10-CM

## 2025-04-08 PROCEDURE — 95117 IMMUNOTHERAPY INJECTIONS: CPT | Performed by: STUDENT IN AN ORGANIZED HEALTH CARE EDUCATION/TRAINING PROGRAM

## 2025-04-09 ENCOUNTER — TELEPHONE (OUTPATIENT)
Dept: ENT CLINIC | Age: 35
End: 2025-04-09

## 2025-04-18 ENCOUNTER — TRANSCRIBE ORDERS (OUTPATIENT)
Dept: ADMINISTRATIVE | Age: 35
End: 2025-04-18

## 2025-04-18 DIAGNOSIS — R31.29 OTHER MICROSCOPIC HEMATURIA: Primary | ICD-10-CM

## 2025-04-22 ENCOUNTER — CLINICAL SUPPORT (OUTPATIENT)
Dept: ENT CLINIC | Age: 35
End: 2025-04-22
Payer: COMMERCIAL

## 2025-04-22 ENCOUNTER — HOSPITAL ENCOUNTER (OUTPATIENT)
Dept: ULTRASOUND IMAGING | Age: 35
Discharge: HOME | End: 2025-04-22
Payer: COMMERCIAL

## 2025-04-22 DIAGNOSIS — R31.29 OTHER MICROSCOPIC HEMATURIA: ICD-10-CM

## 2025-04-22 DIAGNOSIS — J30.2 SEASONAL ALLERGIES: Primary | ICD-10-CM

## 2025-04-22 PROCEDURE — 76770 US EXAM ABDO BACK WALL COMP: CPT

## 2025-04-22 PROCEDURE — 95117 IMMUNOTHERAPY INJECTIONS: CPT | Performed by: STUDENT IN AN ORGANIZED HEALTH CARE EDUCATION/TRAINING PROGRAM

## 2025-04-22 NOTE — PROGRESS NOTES
Correct serum vials verified by patient and nurse. Consent obtained and SVT completed.  SVT P=9mm wheal, M=9mm wheal.  After obtaining consent, and per orders of Dr Marrufo, injections of allergy serum given by JULIO CESAR Mack. Patient instructed to remain in clinic for 30 minutes after injection, and to report any adverse reactions to me immediately. No change in patient status post injection.

## 2025-04-29 ENCOUNTER — CLINICAL SUPPORT (OUTPATIENT)
Dept: ENT CLINIC | Age: 35
End: 2025-04-29
Payer: COMMERCIAL

## 2025-04-29 DIAGNOSIS — J30.2 SEASONAL ALLERGIES: Primary | ICD-10-CM

## 2025-04-29 PROCEDURE — 95117 IMMUNOTHERAPY INJECTIONS: CPT | Performed by: STUDENT IN AN ORGANIZED HEALTH CARE EDUCATION/TRAINING PROGRAM

## 2025-05-13 ENCOUNTER — CLINICAL SUPPORT (OUTPATIENT)
Dept: ENT CLINIC | Age: 35
End: 2025-05-13
Payer: COMMERCIAL

## 2025-05-13 DIAGNOSIS — J30.2 SEASONAL ALLERGIES: Primary | ICD-10-CM

## 2025-05-13 PROCEDURE — 95117 IMMUNOTHERAPY INJECTIONS: CPT | Performed by: STUDENT IN AN ORGANIZED HEALTH CARE EDUCATION/TRAINING PROGRAM

## 2025-05-20 ENCOUNTER — CLINICAL SUPPORT (OUTPATIENT)
Dept: ENT CLINIC | Age: 35
End: 2025-05-20
Payer: COMMERCIAL

## 2025-05-20 DIAGNOSIS — J30.2 SEASONAL ALLERGIES: Primary | ICD-10-CM

## 2025-05-20 PROCEDURE — 95117 IMMUNOTHERAPY INJECTIONS: CPT | Performed by: STUDENT IN AN ORGANIZED HEALTH CARE EDUCATION/TRAINING PROGRAM

## 2025-05-27 ENCOUNTER — CLINICAL SUPPORT (OUTPATIENT)
Dept: ENT CLINIC | Age: 35
End: 2025-05-27
Payer: COMMERCIAL

## 2025-05-27 DIAGNOSIS — J30.2 SEASONAL ALLERGIES: Primary | ICD-10-CM

## 2025-05-27 PROCEDURE — 95117 IMMUNOTHERAPY INJECTIONS: CPT | Performed by: STUDENT IN AN ORGANIZED HEALTH CARE EDUCATION/TRAINING PROGRAM

## 2025-05-28 ENCOUNTER — TELEPHONE (OUTPATIENT)
Dept: ENT CLINIC | Age: 35
End: 2025-05-28

## 2025-05-28 NOTE — TELEPHONE ENCOUNTER
----- Message from NEHEMIAH DAVIS MA sent at 5/28/2025  9:29 AM EDT -----  Pt will need refill on both vials.     Thanks  Eva

## 2025-06-02 ENCOUNTER — NURSE ONLY (OUTPATIENT)
Dept: ENT CLINIC | Age: 35
End: 2025-06-02
Payer: COMMERCIAL

## 2025-06-02 DIAGNOSIS — J30.2 SEASONAL ALLERGIES: Primary | ICD-10-CM

## 2025-06-02 PROCEDURE — 95165 ANTIGEN THERAPY SERVICES: CPT | Performed by: OTOLARYNGOLOGY

## 2025-06-03 ENCOUNTER — CLINICAL SUPPORT (OUTPATIENT)
Dept: ENT CLINIC | Age: 35
End: 2025-06-03
Payer: COMMERCIAL

## 2025-06-03 DIAGNOSIS — J30.2 SEASONAL ALLERGIES: Primary | ICD-10-CM

## 2025-06-03 PROCEDURE — 95117 IMMUNOTHERAPY INJECTIONS: CPT | Performed by: STUDENT IN AN ORGANIZED HEALTH CARE EDUCATION/TRAINING PROGRAM

## 2025-06-10 ENCOUNTER — CLINICAL SUPPORT (OUTPATIENT)
Dept: ENT CLINIC | Age: 35
End: 2025-06-10
Payer: COMMERCIAL

## 2025-06-10 DIAGNOSIS — J30.2 SEASONAL ALLERGIES: Primary | ICD-10-CM

## 2025-06-10 PROCEDURE — 95117 IMMUNOTHERAPY INJECTIONS: CPT | Performed by: STUDENT IN AN ORGANIZED HEALTH CARE EDUCATION/TRAINING PROGRAM

## 2025-06-17 ENCOUNTER — CLINICAL SUPPORT (OUTPATIENT)
Dept: ENT CLINIC | Age: 35
End: 2025-06-17
Payer: COMMERCIAL

## 2025-06-17 DIAGNOSIS — J30.2 SEASONAL ALLERGIES: Primary | ICD-10-CM

## 2025-06-17 PROCEDURE — 95117 IMMUNOTHERAPY INJECTIONS: CPT | Performed by: STUDENT IN AN ORGANIZED HEALTH CARE EDUCATION/TRAINING PROGRAM

## 2025-06-24 ENCOUNTER — CLINICAL SUPPORT (OUTPATIENT)
Dept: ENT CLINIC | Age: 35
End: 2025-06-24
Payer: COMMERCIAL

## 2025-06-24 DIAGNOSIS — J30.2 SEASONAL ALLERGIES: Primary | ICD-10-CM

## 2025-06-24 PROCEDURE — 95117 IMMUNOTHERAPY INJECTIONS: CPT | Performed by: STUDENT IN AN ORGANIZED HEALTH CARE EDUCATION/TRAINING PROGRAM

## 2025-07-16 ENCOUNTER — CLINICAL SUPPORT (OUTPATIENT)
Dept: ENT CLINIC | Age: 35
End: 2025-07-16
Payer: COMMERCIAL

## 2025-07-16 DIAGNOSIS — J30.2 SEASONAL ALLERGIES: Primary | ICD-10-CM

## 2025-07-16 PROCEDURE — 95117 IMMUNOTHERAPY INJECTIONS: CPT | Performed by: OTOLARYNGOLOGY

## 2025-07-16 NOTE — PROGRESS NOTES
Correct serum vials verified by patient and nurse. After obtaining consent, and per orders of Dr Marrufo , injections of allergy serum given by JULIO CESAR Mack. Patient instructed to remain in clinic for 30 minutes after injection, and to report any adverse reactions to me immediately. No change in patient status post injection.

## 2025-07-23 ENCOUNTER — CLINICAL SUPPORT (OUTPATIENT)
Dept: ENT CLINIC | Age: 35
End: 2025-07-23
Payer: COMMERCIAL

## 2025-07-23 DIAGNOSIS — J30.2 SEASONAL ALLERGIES: Primary | ICD-10-CM

## 2025-07-23 PROCEDURE — 95117 IMMUNOTHERAPY INJECTIONS: CPT | Performed by: OTOLARYNGOLOGY

## 2025-07-30 ENCOUNTER — CLINICAL SUPPORT (OUTPATIENT)
Dept: ENT CLINIC | Age: 35
End: 2025-07-30
Payer: COMMERCIAL

## 2025-07-30 DIAGNOSIS — J30.2 SEASONAL ALLERGIES: Primary | ICD-10-CM

## 2025-07-30 PROCEDURE — 95117 IMMUNOTHERAPY INJECTIONS: CPT | Performed by: OTOLARYNGOLOGY

## 2025-07-30 NOTE — PROGRESS NOTES
Correct serum vials verified by patient and nurse. Consent obtained and SVT completed.  SVT P=7mm wheal, M=8mm wheal.  After obtaining consent, and per orders of Dr Marrufo, injections of allergy serum given by JULIO CESAR Mack. Patient instructed to remain in clinic for 30 minutes after injection, and to report any adverse reactions to me immediately. No change in patient status post injection.

## 2025-08-06 ENCOUNTER — CLINICAL SUPPORT (OUTPATIENT)
Dept: ENT CLINIC | Age: 35
End: 2025-08-06
Payer: COMMERCIAL

## 2025-08-06 DIAGNOSIS — J30.2 SEASONAL ALLERGIES: Primary | ICD-10-CM

## 2025-08-06 PROCEDURE — 95117 IMMUNOTHERAPY INJECTIONS: CPT | Performed by: OTOLARYNGOLOGY

## 2025-08-13 ENCOUNTER — CLINICAL SUPPORT (OUTPATIENT)
Dept: ENT CLINIC | Age: 35
End: 2025-08-13
Payer: COMMERCIAL

## 2025-08-13 DIAGNOSIS — J30.2 SEASONAL ALLERGIES: Primary | ICD-10-CM

## 2025-08-13 PROCEDURE — 95117 IMMUNOTHERAPY INJECTIONS: CPT | Performed by: OTOLARYNGOLOGY

## 2025-08-20 ENCOUNTER — CLINICAL SUPPORT (OUTPATIENT)
Dept: ENT CLINIC | Age: 35
End: 2025-08-20
Payer: COMMERCIAL

## 2025-08-20 DIAGNOSIS — J30.2 SEASONAL ALLERGIES: Primary | ICD-10-CM

## 2025-08-20 PROCEDURE — 95117 IMMUNOTHERAPY INJECTIONS: CPT | Performed by: OTOLARYNGOLOGY

## 2025-09-03 ENCOUNTER — CLINICAL SUPPORT (OUTPATIENT)
Dept: ENT CLINIC | Age: 35
End: 2025-09-03
Payer: COMMERCIAL

## 2025-09-03 DIAGNOSIS — J30.2 SEASONAL ALLERGIES: Primary | ICD-10-CM

## 2025-09-03 PROCEDURE — 95117 IMMUNOTHERAPY INJECTIONS: CPT | Performed by: OTOLARYNGOLOGY

## (undated) DEVICE — PATIENT TRACKER 9734887XOM NON-INVASIVE

## (undated) DEVICE — TUBING, SUCTION, 3/16" X 12', STRAIGHT: Brand: MEDLINE

## (undated) DEVICE — SUTURE ABSORBABLE MONOFILAMENT 4-0 SC-1 18 IN PLN GUT 1824H

## (undated) DEVICE — SOLUTION IV 500ML 0.9% SOD CHL PH 5 INJ USP VIAFLX PLAS

## (undated) DEVICE — SPLINT NSL W0.6XL2IN INTNSL CHITOSAN POLYMER HYDRATED

## (undated) DEVICE — SYRINGE, LUER LOCK, 10ML: Brand: MEDLINE

## (undated) DEVICE — 60 ML SYRINGE,CATHETER TIP: Brand: MONOJECT

## (undated) DEVICE — INSTRUMENT TRACKER 9733533XOM ENT 1PK

## (undated) DEVICE — STANDARD HYPODERMIC NEEDLE,POLYPROPYLENE HUB: Brand: MONOJECT

## (undated) DEVICE — Z DISCONTINUED NO SUB SUTURE CHROMIC GUT SZ 4-0 L18IN ABSRB BRN L13MM P-3 3/8 CIR 1654G

## (undated) DEVICE — CODMAN® SURGICAL PATTIES 1/2" X 3" (1.27CM X 7.62CM): Brand: CODMAN®

## (undated) DEVICE — COAGULATOR SUCT 10FR L6IN HND FT SWCH VALLEYLAB

## (undated) DEVICE — SOLUTION IV 1000ML 0.9% SOD CHL

## (undated) DEVICE — GLOVE ORANGE PI 7 1/2   MSG9075

## (undated) DEVICE — PACK PROCEDURE SURG ORAL CDS

## (undated) DEVICE — KIT,ANTI FOG,W/SPONGE & FLUID,SOFT PACK: Brand: MEDLINE